# Patient Record
Sex: MALE | Race: ASIAN | Employment: FULL TIME | URBAN - METROPOLITAN AREA
[De-identification: names, ages, dates, MRNs, and addresses within clinical notes are randomized per-mention and may not be internally consistent; named-entity substitution may affect disease eponyms.]

---

## 2017-01-11 ENCOUNTER — LAB ENCOUNTER (OUTPATIENT)
Dept: LAB | Age: 52
End: 2017-01-11
Attending: FAMILY MEDICINE
Payer: COMMERCIAL

## 2017-01-11 ENCOUNTER — OFFICE VISIT (OUTPATIENT)
Dept: FAMILY MEDICINE CLINIC | Facility: CLINIC | Age: 52
End: 2017-01-11

## 2017-01-11 VITALS
WEIGHT: 188 LBS | BODY MASS INDEX: 30.22 KG/M2 | HEIGHT: 66 IN | SYSTOLIC BLOOD PRESSURE: 138 MMHG | HEART RATE: 76 BPM | TEMPERATURE: 98 F | DIASTOLIC BLOOD PRESSURE: 85 MMHG

## 2017-01-11 DIAGNOSIS — Z00.00 PHYSICAL EXAM: ICD-10-CM

## 2017-01-11 DIAGNOSIS — Z00.00 PHYSICAL EXAM: Primary | ICD-10-CM

## 2017-01-11 DIAGNOSIS — Z12.11 COLON CANCER SCREENING: ICD-10-CM

## 2017-01-11 LAB
ALBUMIN SERPL BCP-MCNC: 5 G/DL (ref 3.5–4.8)
ALBUMIN/GLOB SERPL: 1.7 {RATIO} (ref 1–2)
ALP SERPL-CCNC: 62 U/L (ref 32–100)
ALT SERPL-CCNC: 45 U/L (ref 17–63)
ANION GAP SERPL CALC-SCNC: 12 MMOL/L (ref 0–18)
APPEARANCE: CLEAR
AST SERPL-CCNC: 32 U/L (ref 15–41)
BASOPHILS # BLD: 0.1 K/UL (ref 0–0.2)
BASOPHILS NFR BLD: 1 %
BILIRUB SERPL-MCNC: 1.2 MG/DL (ref 0.3–1.2)
BILIRUBIN: NEGATIVE
BUN SERPL-MCNC: 12 MG/DL (ref 8–20)
BUN/CREAT SERPL: 10.3 (ref 10–20)
CALCIUM SERPL-MCNC: 9.9 MG/DL (ref 8.5–10.5)
CHLORIDE SERPL-SCNC: 99 MMOL/L (ref 95–110)
CHOLEST SERPL-MCNC: 124 MG/DL (ref 110–200)
CO2 SERPL-SCNC: 25 MMOL/L (ref 22–32)
CREAT SERPL-MCNC: 1.16 MG/DL (ref 0.5–1.5)
EOSINOPHIL # BLD: 0.3 K/UL (ref 0–0.7)
EOSINOPHIL NFR BLD: 3 %
ERYTHROCYTE [DISTWIDTH] IN BLOOD BY AUTOMATED COUNT: 13.9 % (ref 11–15)
GLOBULIN PLAS-MCNC: 2.9 G/DL (ref 2.5–3.7)
GLUCOSE (URINE DIPSTICK): NEGATIVE MG/DL
GLUCOSE SERPL-MCNC: 104 MG/DL (ref 70–99)
HBA1C MFR BLD: 5.9 % (ref 4–6)
HCT VFR BLD AUTO: 49.9 % (ref 41–52)
HDLC SERPL-MCNC: 37 MG/DL
HGB BLD-MCNC: 16.8 G/DL (ref 13.5–17.5)
KETONES (URINE DIPSTICK): NEGATIVE MG/DL
LDLC SERPL CALC-MCNC: 62 MG/DL (ref 0–99)
LEUKOCYTES: NEGATIVE
LYMPHOCYTES # BLD: 3.2 K/UL (ref 1–4)
LYMPHOCYTES NFR BLD: 35 %
MCH RBC QN AUTO: 29.1 PG (ref 27–32)
MCHC RBC AUTO-ENTMCNC: 33.7 G/DL (ref 32–37)
MCV RBC AUTO: 86.4 FL (ref 80–100)
MONOCYTES # BLD: 0.7 K/UL (ref 0–1)
MONOCYTES NFR BLD: 8 %
MULTISTIX LOT#: NORMAL NUMERIC
NEUTROPHILS # BLD AUTO: 5 K/UL (ref 1.8–7.7)
NEUTROPHILS NFR BLD: 54 %
NITRITE, URINE: NEGATIVE
NONHDLC SERPL-MCNC: 87 MG/DL
OCCULT BLOOD: NEGATIVE
OSMOLALITY UR CALC.SUM OF ELEC: 282 MOSM/KG (ref 275–295)
PH, URINE: 5 (ref 4.5–8)
PLATELET # BLD AUTO: 319 K/UL (ref 140–400)
PMV BLD AUTO: 7.9 FL (ref 7.4–10.3)
POTASSIUM SERPL-SCNC: 4.8 MMOL/L (ref 3.3–5.1)
PROT SERPL-MCNC: 7.9 G/DL (ref 5.9–8.4)
PROTEIN (URINE DIPSTICK): NEGATIVE MG/DL
RBC # BLD AUTO: 5.78 M/UL (ref 4.5–5.9)
SODIUM SERPL-SCNC: 136 MMOL/L (ref 136–144)
SPECIFIC GRAVITY: 1.01 (ref 1–1.03)
TRIGL SERPL-MCNC: 125 MG/DL (ref 1–149)
TSH SERPL-ACNC: 2.03 UIU/ML (ref 0.34–5.6)
URINE-COLOR: YELLOW
UROBILINOGEN,SEMI-QN: 0.2 MG/DL (ref 0–1.9)
WBC # BLD AUTO: 9.3 K/UL (ref 4–11)

## 2017-01-11 PROCEDURE — 84443 ASSAY THYROID STIM HORMONE: CPT

## 2017-01-11 PROCEDURE — 36415 COLL VENOUS BLD VENIPUNCTURE: CPT

## 2017-01-11 PROCEDURE — 82306 VITAMIN D 25 HYDROXY: CPT | Performed by: FAMILY MEDICINE

## 2017-01-11 PROCEDURE — 93000 ELECTROCARDIOGRAM COMPLETE: CPT | Performed by: FAMILY MEDICINE

## 2017-01-11 PROCEDURE — 93005 ELECTROCARDIOGRAM TRACING: CPT | Performed by: FAMILY MEDICINE

## 2017-01-11 PROCEDURE — 80061 LIPID PANEL: CPT

## 2017-01-11 PROCEDURE — 99396 PREV VISIT EST AGE 40-64: CPT | Performed by: FAMILY MEDICINE

## 2017-01-11 PROCEDURE — 83036 HEMOGLOBIN GLYCOSYLATED A1C: CPT

## 2017-01-11 PROCEDURE — 80053 COMPREHEN METABOLIC PANEL: CPT

## 2017-01-11 PROCEDURE — 81002 URINALYSIS NONAUTO W/O SCOPE: CPT | Performed by: FAMILY MEDICINE

## 2017-01-11 PROCEDURE — 85025 COMPLETE CBC W/AUTO DIFF WBC: CPT

## 2017-01-11 NOTE — PROGRESS NOTES
Quick Note:    Please call patient, results are within normal limits.  Unchanged from previous year A1c prediabetic range continue the same recommendations last year that his low carb diet exercise and recheck in 1 year  ______

## 2017-01-11 NOTE — PROGRESS NOTES
1/11/2017  8:51 AM    Bear Araya is a 54year old male. Chief complaint(s): Patient presents with:  Routine Physical    HPI:     Bear Araya primary complaint is regarding CPE.      Bear Araya is a 54year old male is here for rout 90 tablet Rfl: 0   allopurinol (ZYLOPRIM) 300 MG Oral Tab Take 1 tablet (300 mg total) by mouth daily. Take 1 tablet(s) by mouth daily Disp: 90 tablet Rfl: 1   Atorvastatin Calcium (LIPITOR) 20 MG Oral Tab Take 1 tablet (20 mg total) by mouth nightly.  Take well-nourished.    /85 mmHg  Pulse 76  Temp(Src) 98 °F (36.7 °C) (Oral)  Ht 5' 6\" (1.676 m)  Wt 188 lb (85.276 kg)  BMI 30.36 kg/m2     HENT:   Normal cephalic, normal light red reflex; pupils equally reactive to light and accomodation bilaterally; c SPECIFIC GRAVITY 1.015 1.005 - 1.030   OCCULT BLOOD negative Negative   PH, URINE 5.0 4.5 - 8.0   PROTEIN (URINE DIPSTICK) negative Negative/Trace mg/dL   UROBILINOGEN,SEMI-QN 0.2 0.0 - 1.9 mg/dL   NITRITE, URINE negative Negative   LEUKOCYTES negative N testicular exam. Patient was educated on sexual transmitted disease. Best to abstain from sexual intercourse until he is ready to form a family. Use of condoms may prevent transmission of infections as well as pregnancy.     FOLLOW-UP: Schedule a follow-up

## 2017-01-13 LAB — 25(OH)D3 SERPL-MCNC: 19.3 NG/ML

## 2017-01-13 RX ORDER — ERGOCALCIFEROL 1.25 MG/1
50000 CAPSULE ORAL WEEKLY
Qty: 12 CAPSULE | Refills: 4 | Status: SHIPPED | OUTPATIENT
Start: 2017-01-13 | End: 2017-02-12

## 2017-01-13 NOTE — PROGRESS NOTES
Quick Note:    Please notify patient that his/her blood test showed low levels of vitamin D. A prescription was sent to his pharmacy to take one capsule or tablet, once a week. This Rx is good for one year.  We'll recheck levels in one year.  ______

## 2017-01-17 ENCOUNTER — TELEPHONE (OUTPATIENT)
Dept: FAMILY MEDICINE CLINIC | Facility: CLINIC | Age: 52
End: 2017-01-17

## 2017-01-17 NOTE — TELEPHONE ENCOUNTER
Attempted to reach pt no answer left detailed message on VM to call back office. Pt's hgb A1C shows pre-diabetes same as last year will need to follow a low carb diet and exercise will recheck in one year.  Also his Vit D level is low will need to supplemen

## 2017-01-17 NOTE — TELEPHONE ENCOUNTER
----- Message from Maribell Ty MD sent at 1/12/2017  9:03 AM CST -----  Please call patient, results are within normal limits.  CPM

## 2017-01-18 ENCOUNTER — TELEPHONE (OUTPATIENT)
Dept: FAMILY MEDICINE CLINIC | Facility: CLINIC | Age: 52
End: 2017-01-18

## 2017-01-18 NOTE — TELEPHONE ENCOUNTER
----- Message from Josef Powell MD sent at 1/13/2017  2:19 PM CST -----  Please notify patient that his/her blood test showed low levels of vitamin D. A prescription was sent to his pharmacy to take one capsule or tablet, once a week.  This Rx is good

## 2017-02-13 ENCOUNTER — TELEPHONE (OUTPATIENT)
Dept: FAMILY MEDICINE CLINIC | Facility: CLINIC | Age: 52
End: 2017-02-13

## 2017-02-13 NOTE — TELEPHONE ENCOUNTER
RN states that pt. Is scheduled to come in today 2/13 at 1:00 for Consult appt. , so sheis Requesting to get last office notes, and any labs. Please fax to [facility] #  [phone].

## 2017-03-01 RX ORDER — ALLOPURINOL 300 MG/1
TABLET ORAL
Qty: 30 TABLET | Refills: 0 | Status: CANCELLED | OUTPATIENT
Start: 2017-03-01

## 2017-03-01 RX ORDER — COLCHICINE 0.6 MG/1
0.6 TABLET ORAL DAILY
Qty: 90 TABLET | Refills: 0 | Status: SHIPPED | OUTPATIENT
Start: 2017-03-01 | End: 2018-03-01

## 2017-03-01 RX ORDER — ALLOPURINOL 300 MG/1
TABLET ORAL
Qty: 90 TABLET | Refills: 0 | Status: SHIPPED | OUTPATIENT
Start: 2017-03-01 | End: 2017-05-16

## 2017-03-01 RX ORDER — LISINOPRIL AND HYDROCHLOROTHIAZIDE 20; 12.5 MG/1; MG/1
1 TABLET ORAL DAILY
Qty: 90 TABLET | Refills: 0 | Status: SHIPPED | OUTPATIENT
Start: 2017-03-01 | End: 2017-06-12

## 2017-03-01 NOTE — TELEPHONE ENCOUNTER
From: Yong Backfelix  To:  Ronn Rahman MD  Sent: 2/28/2017 2:48 PM CST  Subject: Medication Renewal Request    Original authorizing provider: MD Yong Hoffmann would like a refill of the following medications:  Eulalia Duff

## 2017-03-01 NOTE — TELEPHONE ENCOUNTER
Chart reviewed. Refills sent per Triage Dept protocol.    Hypertensive Medications, Gout  Protocol Criteria:  · Appointment scheduled in the past 6 months or in the next 3 months  · BMP or CMP in the past 12 months  · Creatinine result < 2  Recent Visits

## 2017-03-01 NOTE — TELEPHONE ENCOUNTER
From: Tiffany Bragg  To:  Theresa Odom MD  Sent: 2/28/2017 2:43 PM CST  Subject: Medication Renewal Request    Original authorizing provider: MD Tiffany Bro would like a refill of the following medications:  colchicine

## 2017-04-07 RX ORDER — ATORVASTATIN CALCIUM 20 MG/1
TABLET, FILM COATED ORAL
Qty: 90 TABLET | Refills: 0 | Status: SHIPPED | OUTPATIENT
Start: 2017-04-07 | End: 2017-09-08

## 2017-04-07 RX ORDER — ALLOPURINOL 300 MG/1
TABLET ORAL
Qty: 90 TABLET | Refills: 0 | OUTPATIENT
Start: 2017-04-07

## 2017-04-07 RX ORDER — LISINOPRIL AND HYDROCHLOROTHIAZIDE 20; 12.5 MG/1; MG/1
1 TABLET ORAL DAILY
Qty: 90 TABLET | Refills: 0 | OUTPATIENT
Start: 2017-04-07

## 2017-04-07 NOTE — TELEPHONE ENCOUNTER
Cholesterol Medications: DR. WANG,  Please review and advise on drug interaction. Thanks.   Protocol Criteria:  · Appointment scheduled in the past 12 months or in the next 3 months  · ALT & LDL on file in the past 12 months  · ALT result < 80  · LDL result <

## 2017-04-07 NOTE — TELEPHONE ENCOUNTER
From: Eugene Ndiaye  To:  Veronika Dawn MD  Sent: 4/4/2017 1:42 PM CDT  Subject: Medication Renewal Request    Original authorizing provider: MD Eugene Huerta would like a refill of the following medications:  Tram Gutierrez

## 2017-05-16 RX ORDER — ALLOPURINOL 300 MG/1
TABLET ORAL
Qty: 90 TABLET | Refills: 0 | Status: SHIPPED | OUTPATIENT
Start: 2017-05-16 | End: 2017-10-26

## 2017-05-16 RX ORDER — LISINOPRIL AND HYDROCHLOROTHIAZIDE 20; 12.5 MG/1; MG/1
1 TABLET ORAL DAILY
Qty: 90 TABLET | Refills: 0 | Status: SHIPPED | OUTPATIENT
Start: 2017-05-16 | End: 2017-06-13

## 2017-05-16 NOTE — TELEPHONE ENCOUNTER
Per RN refill protocol, rx's sent to pharm    Hypertensive Medications  Protocol Criteria:  · Appointment scheduled in the past 6 months or in the next 3 months  · BMP or CMP in the past 12 months  · Creatinine result < 2  Recent Visits       Provider Prince

## 2017-05-16 NOTE — TELEPHONE ENCOUNTER
From: Darice Dakin  To:  Teresita Crespo MD  Sent: 5/5/2017 9:52 AM CDT  Subject: Medication Renewal Request    Original authorizing provider: Margaret Grays, MD Darice Dakin would like a refill of the following medications:  Lisinopril-

## 2017-06-12 ENCOUNTER — PATIENT MESSAGE (OUTPATIENT)
Dept: FAMILY MEDICINE CLINIC | Facility: CLINIC | Age: 52
End: 2017-06-12

## 2017-06-12 RX ORDER — LISINOPRIL AND HYDROCHLOROTHIAZIDE 20; 12.5 MG/1; MG/1
TABLET ORAL
Qty: 90 TABLET | Refills: 1 | Status: SHIPPED | OUTPATIENT
Start: 2017-06-12 | End: 2018-04-17

## 2017-06-13 RX ORDER — LISINOPRIL AND HYDROCHLOROTHIAZIDE 20; 12.5 MG/1; MG/1
1 TABLET ORAL DAILY
Qty: 90 TABLET | Refills: 0 | Status: SHIPPED | OUTPATIENT
Start: 2017-06-13 | End: 2017-09-23

## 2017-06-13 RX ORDER — LISINOPRIL AND HYDROCHLOROTHIAZIDE 20; 12.5 MG/1; MG/1
1 TABLET ORAL DAILY
Qty: 30 TABLET | Refills: 0 | Status: SHIPPED | OUTPATIENT
Start: 2017-06-13 | End: 2018-04-17

## 2017-06-13 NOTE — TELEPHONE ENCOUNTER
From: Taylor Muller  To:  tOto Mcduffie MD  Sent: 6/12/2017 10:52 AM CDT  Subject: Medication Renewal Request    Original authorizing provider: MD Taylor Escobar would like a refill of the following medications:  Sergio Schrader

## 2017-06-13 NOTE — TELEPHONE ENCOUNTER
From: Randolph Quiroz  To:  Yesi Eric MD  Sent: 6/12/2017 10:49 AM CDT  Subject: Medication Renewal Request    Original authorizing provider: MD Randolph Johnson would like a refill of the following medications:  Angie Landry

## 2017-06-13 NOTE — TELEPHONE ENCOUNTER
30 day sent to local in previous encounter. 90 day sent to mail order per protocol.     Hypertensive Medications  Protocol Criteria:  · Appointment scheduled in the past 6 months or in the next 3 months  · BMP or CMP in the past 12 months  · Creatinine re

## 2017-06-13 NOTE — TELEPHONE ENCOUNTER
Refilled x1 per protocol.    Hypertensive Medications  Protocol Criteria:  · Appointment scheduled in the past 6 months or in the next 3 months  · BMP or CMP in the past 12 months  · Creatinine result < 2  Recent Visits       Provider Department Primary Dx

## 2017-08-19 ENCOUNTER — TELEPHONE (OUTPATIENT)
Dept: FAMILY MEDICINE CLINIC | Facility: CLINIC | Age: 52
End: 2017-08-19

## 2017-09-08 RX ORDER — ATORVASTATIN CALCIUM 20 MG/1
TABLET, FILM COATED ORAL
Qty: 90 TABLET | Refills: 0 | Status: SHIPPED | OUTPATIENT
Start: 2017-09-08 | End: 2017-10-16

## 2017-09-24 RX ORDER — LISINOPRIL AND HYDROCHLOROTHIAZIDE 20; 12.5 MG/1; MG/1
TABLET ORAL
Qty: 90 TABLET | Refills: 0 | Status: SHIPPED | OUTPATIENT
Start: 2017-09-24 | End: 2017-12-29

## 2017-10-17 RX ORDER — ATORVASTATIN CALCIUM 20 MG/1
TABLET, FILM COATED ORAL
Qty: 90 TABLET | Refills: 0 | Status: SHIPPED | OUTPATIENT
Start: 2017-10-17 | End: 2018-03-10

## 2017-10-26 ENCOUNTER — TELEPHONE (OUTPATIENT)
Dept: FAMILY MEDICINE CLINIC | Facility: CLINIC | Age: 52
End: 2017-10-26

## 2017-10-26 RX ORDER — ALLOPURINOL 300 MG/1
TABLET ORAL
Qty: 90 TABLET | Refills: 0 | Status: SHIPPED | OUTPATIENT
Start: 2017-10-26 | End: 2018-01-25

## 2017-10-26 NOTE — TELEPHONE ENCOUNTER
Pt's pharmacy called in requesting a 90 day supply for the following medication:      Current Outpatient Prescriptions:   •  allopurinol 300 MG Oral Tab, Take 1 tablet by mouth  daily, Disp: 90 tablet, Rfl: 0

## 2017-10-26 NOTE — TELEPHONE ENCOUNTER
Refill Protocol Appointment Criteria  · Appointment scheduled in the past 6 months or in the next 3 months  Recent Outpatient Visits            9 months ago Physical exam    Sara Narvaez MD    Office Visit    1 y

## 2017-12-29 RX ORDER — LISINOPRIL AND HYDROCHLOROTHIAZIDE 20; 12.5 MG/1; MG/1
TABLET ORAL
Qty: 30 TABLET | Refills: 0 | Status: SHIPPED | OUTPATIENT
Start: 2017-12-29 | End: 2018-01-25

## 2018-01-25 RX ORDER — LISINOPRIL AND HYDROCHLOROTHIAZIDE 20; 12.5 MG/1; MG/1
TABLET ORAL
Qty: 30 TABLET | Refills: 0 | Status: SHIPPED | OUTPATIENT
Start: 2018-01-25 | End: 2018-04-17

## 2018-01-25 RX ORDER — ALLOPURINOL 300 MG/1
TABLET ORAL
Qty: 30 TABLET | Refills: 0 | Status: SHIPPED | OUTPATIENT
Start: 2018-01-25 | End: 2018-03-17

## 2018-02-05 RX ORDER — LISINOPRIL AND HYDROCHLOROTHIAZIDE 20; 12.5 MG/1; MG/1
1 TABLET ORAL
Qty: 30 TABLET | Refills: 0
Start: 2018-02-05

## 2018-02-05 RX ORDER — ALLOPURINOL 300 MG/1
300 TABLET ORAL
Qty: 30 TABLET | Refills: 0
Start: 2018-02-05

## 2018-02-05 RX ORDER — ATORVASTATIN CALCIUM 20 MG/1
20 TABLET, FILM COATED ORAL NIGHTLY
Qty: 90 TABLET | Refills: 0
Start: 2018-02-05

## 2018-02-05 NOTE — TELEPHONE ENCOUNTER
Please call patient to set up an appointment with me. In order to refills meds needs a f/u appt with me.

## 2018-03-01 ENCOUNTER — NURSE TRIAGE (OUTPATIENT)
Dept: FAMILY MEDICINE CLINIC | Facility: CLINIC | Age: 53
End: 2018-03-01

## 2018-03-01 NOTE — TELEPHONE ENCOUNTER
Pt called in, he is not able to see  today. He is still requesting a refill of the medication for gout flare up. Pt is willing to schedule an appt but no 9am appts soon. He is requesting that Dr. Radha Brush call him directly. Phone number is [phone].

## 2018-03-01 NOTE — TELEPHONE ENCOUNTER
After reviewing chart, I left message for patient to call and come in today. CSS book him in available slot 10:15am with Dr. Sukhjinder Rahman if still available.      Will send to DR WANG for review

## 2018-03-01 NOTE — TELEPHONE ENCOUNTER
Dr Sammy Uriarte: patient called for refill on his colcrys medication. He started with RT big toe gout flare up yesterday. Now painful to walk. He was hoping to start medication today. LOV 1/2017.  I informed him we haven't seen him in a year and I am unabl

## 2018-03-02 RX ORDER — COLCHICINE 0.6 MG/1
0.6 TABLET ORAL DAILY
Qty: 30 TABLET | Refills: 0 | Status: SHIPPED
Start: 2018-03-02 | End: 2018-04-17

## 2018-03-02 NOTE — TELEPHONE ENCOUNTER
Dr Clari Bolden for Dr Susann Carrel,    See messages below. Can pt have refill on colcry?     Refill Protocol Appointment Criteria  · Appointment scheduled in the past 6 months or in the next 3 months  Recent Outpatient Visits            1 year ago Physical exam    [place]

## 2018-03-03 RX ORDER — COLCHICINE 0.6 MG/1
TABLET ORAL
Qty: 90 TABLET | Refills: 0 | OUTPATIENT
Start: 2018-03-03

## 2018-03-12 RX ORDER — LISINOPRIL AND HYDROCHLOROTHIAZIDE 20; 12.5 MG/1; MG/1
TABLET ORAL
Qty: 10 TABLET | Refills: 0 | Status: SHIPPED | OUTPATIENT
Start: 2018-03-12 | End: 2018-03-17

## 2018-03-12 RX ORDER — ATORVASTATIN CALCIUM 20 MG/1
TABLET, FILM COATED ORAL
Qty: 10 TABLET | Refills: 0 | Status: SHIPPED | OUTPATIENT
Start: 2018-03-12 | End: 2018-04-17

## 2018-03-12 NOTE — TELEPHONE ENCOUNTER
appt made, ok to refill?      Future Appointments  Date Time Provider Jacqueline Callahan   4/6/2018 8:15 AM Sachin Melendez MD [facility]

## 2018-03-13 NOTE — TELEPHONE ENCOUNTER
From: Devin Hernández  Sent: 3/10/2018 2:24 PM CST  Subject: Medication Renewal Request    Devin Hernández would like a refill of the following medications:     ATORVASTATIN 20 MG Oral Tab Danisha Rey MD]     LISINOPRIL-HYDROCHLOROTHIAZIDE 20-

## 2018-03-15 RX ORDER — ALLOPURINOL 300 MG/1
300 TABLET ORAL
Qty: 30 TABLET | Refills: 0
Start: 2018-03-15

## 2018-03-15 RX ORDER — ATORVASTATIN CALCIUM 20 MG/1
TABLET, FILM COATED ORAL
Qty: 10 TABLET | Refills: 0 | Status: CANCELLED
Start: 2018-03-15

## 2018-03-15 RX ORDER — LISINOPRIL AND HYDROCHLOROTHIAZIDE 20; 12.5 MG/1; MG/1
1 TABLET ORAL
Qty: 10 TABLET | Refills: 0
Start: 2018-03-15

## 2018-03-17 RX ORDER — LISINOPRIL AND HYDROCHLOROTHIAZIDE 20; 12.5 MG/1; MG/1
1 TABLET ORAL
Qty: 30 TABLET | Refills: 2 | Status: CANCELLED
Start: 2018-03-17

## 2018-03-17 RX ORDER — ATORVASTATIN CALCIUM 20 MG/1
20 TABLET, FILM COATED ORAL NIGHTLY
Qty: 90 TABLET | Refills: 0 | Status: CANCELLED | OUTPATIENT
Start: 2018-03-17

## 2018-03-17 RX ORDER — ALLOPURINOL 300 MG/1
300 TABLET ORAL
Qty: 30 TABLET | Refills: 2 | Status: CANCELLED
Start: 2018-03-17

## 2018-03-17 NOTE — TELEPHONE ENCOUNTER
Refill protocol failed because the patient did not meet the protocol criteria.  Please advise in regards to refill request       Cholesterol Medications  Protocol Criteria:  · Appointment scheduled in the past 12 months or in the next 3 months  · ALT & LDL 01/11/2017   GLOBULIN 2.9 01/11/2017   AGRATIO 1.4 02/01/2016   ANIONGAP 12 01/11/2017   OSMOCALC 282 01/11/2017

## 2018-03-19 ENCOUNTER — TELEPHONE (OUTPATIENT)
Dept: FAMILY MEDICINE CLINIC | Facility: CLINIC | Age: 53
End: 2018-03-19

## 2018-03-19 RX ORDER — LISINOPRIL AND HYDROCHLOROTHIAZIDE 20; 12.5 MG/1; MG/1
1 TABLET ORAL
Qty: 30 TABLET | Refills: 0 | Status: SHIPPED | OUTPATIENT
Start: 2018-03-19 | End: 2018-04-15

## 2018-03-19 RX ORDER — ATORVASTATIN CALCIUM 20 MG/1
20 TABLET, FILM COATED ORAL NIGHTLY
Qty: 30 TABLET | Refills: 0 | Status: SHIPPED | OUTPATIENT
Start: 2018-03-19 | End: 2018-04-17

## 2018-03-19 RX ORDER — ALLOPURINOL 300 MG/1
300 TABLET ORAL
Qty: 30 TABLET | Refills: 0 | Status: SHIPPED | OUTPATIENT
Start: 2018-03-19 | End: 2018-04-16

## 2018-03-19 NOTE — TELEPHONE ENCOUNTER
Dr. Heena Schwartz: patient made appt 4/6/18. Are you ok to approved a month supply. On 3/10/18, Dr. Dr. Heena Schwartz approved #10. However it went to the mail order and they do not send short supply. Can rx be sent to [place] for 30qty?

## 2018-03-19 NOTE — TELEPHONE ENCOUNTER
SEE other encounters. Gratci. Spoke to representative, [place], and will cancel RX for lisinopril/hctz, and atorvastatin. See 3/17 encounter. Request has been sent to Dr. Avis Johnston to authorize 30 day supply to local [facility] if he approves.   Pa

## 2018-03-19 NOTE — TELEPHONE ENCOUNTER
Pt calling on status of rxs  Out of meds    Confirmed Dav[facility]  does not want sent to mailorder    Requesting to pharmacy today

## 2018-03-20 RX ORDER — ALLOPURINOL 300 MG/1
TABLET ORAL
Qty: 90 TABLET | Refills: 0 | Status: SHIPPED | OUTPATIENT
Start: 2018-03-20 | End: 2018-04-17

## 2018-04-06 NOTE — TELEPHONE ENCOUNTER
Pt had an appt to see Dr Pramod Ayala for a physical but arrived to office 40 min late so he had to be rescheduled. Pt stated that he was low on all medications and wanted Dr to refill all his prescriptions .  Pt appt was made for June 4th the next available for physical

## 2018-04-10 RX ORDER — ATORVASTATIN CALCIUM 20 MG/1
TABLET, FILM COATED ORAL
Qty: 10 TABLET | Refills: 0 | Status: CANCELLED | OUTPATIENT
Start: 2018-04-10

## 2018-04-14 RX ORDER — ALLOPURINOL 300 MG/1
TABLET ORAL
Qty: 30 TABLET | Refills: 0 | OUTPATIENT
Start: 2018-04-14

## 2018-04-15 RX ORDER — ALLOPURINOL 300 MG/1
300 TABLET ORAL
Qty: 90 TABLET | Refills: 0 | Status: CANCELLED
Start: 2018-04-15

## 2018-04-17 ENCOUNTER — LAB ENCOUNTER (OUTPATIENT)
Dept: LAB | Age: 53
End: 2018-04-17
Attending: FAMILY MEDICINE
Payer: COMMERCIAL

## 2018-04-17 ENCOUNTER — OFFICE VISIT (OUTPATIENT)
Dept: FAMILY MEDICINE CLINIC | Facility: CLINIC | Age: 53
End: 2018-04-17

## 2018-04-17 VITALS
HEIGHT: 66 IN | HEART RATE: 69 BPM | BODY MASS INDEX: 31.02 KG/M2 | TEMPERATURE: 98 F | SYSTOLIC BLOOD PRESSURE: 124 MMHG | WEIGHT: 193 LBS | DIASTOLIC BLOOD PRESSURE: 79 MMHG

## 2018-04-17 DIAGNOSIS — Z00.00 PHYSICAL EXAM: Primary | ICD-10-CM

## 2018-04-17 DIAGNOSIS — Z00.00 PHYSICAL EXAM: ICD-10-CM

## 2018-04-17 PROCEDURE — 84153 ASSAY OF PSA TOTAL: CPT | Performed by: FAMILY MEDICINE

## 2018-04-17 PROCEDURE — 83036 HEMOGLOBIN GLYCOSYLATED A1C: CPT

## 2018-04-17 PROCEDURE — 80061 LIPID PANEL: CPT

## 2018-04-17 PROCEDURE — 81015 MICROSCOPIC EXAM OF URINE: CPT | Performed by: FAMILY MEDICINE

## 2018-04-17 PROCEDURE — 85025 COMPLETE CBC W/AUTO DIFF WBC: CPT

## 2018-04-17 PROCEDURE — 80050 GENERAL HEALTH PANEL: CPT

## 2018-04-17 PROCEDURE — 80053 COMPREHEN METABOLIC PANEL: CPT

## 2018-04-17 PROCEDURE — 36415 COLL VENOUS BLD VENIPUNCTURE: CPT

## 2018-04-17 PROCEDURE — 99396 PREV VISIT EST AGE 40-64: CPT | Performed by: FAMILY MEDICINE

## 2018-04-17 PROCEDURE — 84550 ASSAY OF BLOOD/URIC ACID: CPT

## 2018-04-17 PROCEDURE — 81001 URINALYSIS AUTO W/SCOPE: CPT | Performed by: FAMILY MEDICINE

## 2018-04-17 PROCEDURE — 82306 VITAMIN D 25 HYDROXY: CPT | Performed by: FAMILY MEDICINE

## 2018-04-17 RX ORDER — LISINOPRIL AND HYDROCHLOROTHIAZIDE 20; 12.5 MG/1; MG/1
1 TABLET ORAL
Qty: 10 TABLET | Refills: 0 | Status: SHIPPED | OUTPATIENT
Start: 2018-04-17 | End: 2018-04-17

## 2018-04-17 RX ORDER — ALLOPURINOL 300 MG/1
TABLET ORAL
Qty: 90 TABLET | Refills: 2 | Status: SHIPPED | OUTPATIENT
Start: 2018-04-17 | End: 2019-04-08

## 2018-04-17 RX ORDER — ATORVASTATIN CALCIUM 20 MG/1
TABLET, FILM COATED ORAL
Qty: 90 TABLET | Refills: 2 | Status: SHIPPED | OUTPATIENT
Start: 2018-04-17 | End: 2019-04-24

## 2018-04-17 RX ORDER — LISINOPRIL AND HYDROCHLOROTHIAZIDE 20; 12.5 MG/1; MG/1
1 TABLET ORAL
Qty: 90 TABLET | Refills: 2 | Status: SHIPPED | OUTPATIENT
Start: 2018-04-17 | End: 2019-04-04

## 2018-04-17 RX ORDER — ALLOPURINOL 300 MG/1
TABLET ORAL
Qty: 10 TABLET | Refills: 0 | Status: SHIPPED | OUTPATIENT
Start: 2018-04-17 | End: 2018-04-17

## 2018-04-17 RX ORDER — COLCHICINE 0.6 MG/1
0.6 TABLET ORAL DAILY
Qty: 30 TABLET | Refills: 0 | Status: SHIPPED | OUTPATIENT
Start: 2018-04-17 | End: 2019-04-24

## 2018-04-17 NOTE — PROGRESS NOTES
4/17/2018  3:49 PM    Debi York is a 48year old male.     Chief complaint(s): Patient presents with:  Routine Physical: patient here for his annual physical and needs 90 day supply of medications    HPI:     Debi Yrok primary complaint is mouth daily. Disp: 30 tablet Rfl: 0   Lisinopril-Hydrochlorothiazide 20-12.5 MG Oral Tab Take 1 tablet by mouth once daily.  Disp: 90 tablet Rfl: 2   ValACYclovir HCl (VALTREX) 500 MG Oral Tab Take one tablet once a day Disp:  Rfl:        Allergies:  No Seno bilaterally. Normal finger rubbing hearing exam, bilaterally. Clear nostril normal nasal mucosa. Throat clear without exudate, lesions or enlarge tonsils. Neck: Neck supple. No JVD present. No thyromegaly present.    Cardiovascular: Normal rate, regular Urinalysis, Routine [E]      Urine Microscopic w Reflex CULTURE      Vitamin D, 25-Hydroxy [E]      Uric Acid, Serum   In-House; Urine dip. Test/Procedures done today include: EKG. IMMUNIZATIONS: none given today.     RECOMMENDATIONS given include: ANTI MG Oral Tab 30 tablet 0      Sig: Take 1 tablet (0.6 mg total) by mouth daily. Lisinopril-Hydrochlorothiazide 20-12.5 MG Oral Tab 90 tablet 2      Sig: Take 1 tablet by mouth once daily.            Imaging & Referrals:  EKG 12-LEAD         [facility]IS[facility]

## 2018-04-18 ENCOUNTER — TELEPHONE (OUTPATIENT)
Dept: FAMILY MEDICINE CLINIC | Facility: CLINIC | Age: 53
End: 2018-04-18

## 2018-04-18 RX ORDER — ERGOCALCIFEROL 1.25 MG/1
50000 CAPSULE ORAL WEEKLY
Qty: 12 CAPSULE | Refills: 4 | Status: SHIPPED | OUTPATIENT
Start: 2018-04-18 | End: 2018-05-18

## 2018-04-18 NOTE — TELEPHONE ENCOUNTER
----- Message from Nicolette Manzo MD sent at 4/18/2018  3:06 PM CDT -----  Please notify patient that his/her blood test showed low levels of vitamin D. A prescription was sent to his pharmacy to take one capsule or tablet, once a week.  This Rx is good

## 2018-05-09 RX ORDER — ALLOPURINOL 300 MG/1
300 TABLET ORAL
Qty: 90 TABLET | Refills: 0 | OUTPATIENT
Start: 2018-05-09

## 2018-05-09 RX ORDER — ATORVASTATIN CALCIUM 20 MG/1
20 TABLET, FILM COATED ORAL
Qty: 90 TABLET | Refills: 0 | OUTPATIENT
Start: 2018-05-09

## 2018-05-09 RX ORDER — LISINOPRIL AND HYDROCHLOROTHIAZIDE 20; 12.5 MG/1; MG/1
1 TABLET ORAL
Qty: 30 TABLET | Refills: 0 | OUTPATIENT
Start: 2018-05-09

## 2018-05-17 RX ORDER — LISINOPRIL AND HYDROCHLOROTHIAZIDE 20; 12.5 MG/1; MG/1
1 TABLET ORAL DAILY
Qty: 90 TABLET | Refills: 2 | Status: SHIPPED | OUTPATIENT
Start: 2018-05-17 | End: 2019-03-04

## 2018-06-15 RX ORDER — ALLOPURINOL 300 MG/1
TABLET ORAL
Qty: 30 TABLET | Refills: 0 | OUTPATIENT
Start: 2018-06-15

## 2018-07-20 RX ORDER — ATORVASTATIN CALCIUM 20 MG/1
TABLET, FILM COATED ORAL
Qty: 30 TABLET | Refills: 0 | OUTPATIENT
Start: 2018-07-20

## 2018-12-24 NOTE — TELEPHONE ENCOUNTER
wrong sent to Dr Samuels, will send to Dr Lizabeth Lombardi. Review pended refill request as it does not fall under a protocol.     Last Rx: 4/17/18  LOV: 4/17/18

## 2018-12-26 RX ORDER — COLCHICINE 0.6 MG/1
TABLET, FILM COATED ORAL
Qty: 90 TABLET | Refills: 0 | Status: SHIPPED | OUTPATIENT
Start: 2018-12-26 | End: 2019-04-24

## 2019-03-05 RX ORDER — LISINOPRIL AND HYDROCHLOROTHIAZIDE 20; 12.5 MG/1; MG/1
1 TABLET ORAL DAILY
Qty: 30 TABLET | Refills: 0 | Status: SHIPPED | OUTPATIENT
Start: 2019-03-05 | End: 2019-04-03

## 2019-04-04 RX ORDER — ALLOPURINOL 300 MG/1
TABLET ORAL
Qty: 90 TABLET | Refills: 0 | OUTPATIENT
Start: 2019-04-04

## 2019-04-04 RX ORDER — LISINOPRIL AND HYDROCHLOROTHIAZIDE 20; 12.5 MG/1; MG/1
1 TABLET ORAL DAILY
Qty: 90 TABLET | Refills: 0 | Status: SHIPPED | OUTPATIENT
Start: 2019-04-04 | End: 2019-04-24

## 2019-04-04 NOTE — TELEPHONE ENCOUNTER
Review pended refill request as it does not fall under a protocol. Last RX: 4/17/18 allopurinol 300mg 30 tablet with zero refill.   LOV: 12/26/18

## 2019-04-08 NOTE — TELEPHONE ENCOUNTER
Dr Pramod Ayala please advise on refill for Allopurinol, pt has an appt to see you on 4/24/19. Please see msg below. Per pt, he has an appt already but he needs a refill on his Allopurinol asap.          Documentation

## 2019-04-09 RX ORDER — ALLOPURINOL 300 MG/1
TABLET ORAL
Qty: 30 TABLET | Refills: 0 | Status: SHIPPED | OUTPATIENT
Start: 2019-04-09 | End: 2019-04-24

## 2019-04-24 ENCOUNTER — LAB ENCOUNTER (OUTPATIENT)
Dept: LAB | Age: 54
End: 2019-04-24
Attending: FAMILY MEDICINE
Payer: COMMERCIAL

## 2019-04-24 ENCOUNTER — OFFICE VISIT (OUTPATIENT)
Dept: FAMILY MEDICINE CLINIC | Facility: CLINIC | Age: 54
End: 2019-04-24
Payer: COMMERCIAL

## 2019-04-24 VITALS
BODY MASS INDEX: 30.34 KG/M2 | HEART RATE: 65 BPM | DIASTOLIC BLOOD PRESSURE: 84 MMHG | SYSTOLIC BLOOD PRESSURE: 128 MMHG | TEMPERATURE: 98 F | WEIGHT: 188.81 LBS | HEIGHT: 66 IN

## 2019-04-24 DIAGNOSIS — Z00.00 PHYSICAL EXAM: ICD-10-CM

## 2019-04-24 DIAGNOSIS — Z00.00 PHYSICAL EXAM: Primary | ICD-10-CM

## 2019-04-24 PROCEDURE — 84153 ASSAY OF PSA TOTAL: CPT | Performed by: FAMILY MEDICINE

## 2019-04-24 PROCEDURE — 80053 COMPREHEN METABOLIC PANEL: CPT

## 2019-04-24 PROCEDURE — 81015 MICROSCOPIC EXAM OF URINE: CPT | Performed by: FAMILY MEDICINE

## 2019-04-24 PROCEDURE — 84443 ASSAY THYROID STIM HORMONE: CPT

## 2019-04-24 PROCEDURE — 36415 COLL VENOUS BLD VENIPUNCTURE: CPT

## 2019-04-24 PROCEDURE — 90715 TDAP VACCINE 7 YRS/> IM: CPT | Performed by: FAMILY MEDICINE

## 2019-04-24 PROCEDURE — 85025 COMPLETE CBC W/AUTO DIFF WBC: CPT

## 2019-04-24 PROCEDURE — 82306 VITAMIN D 25 HYDROXY: CPT | Performed by: FAMILY MEDICINE

## 2019-04-24 PROCEDURE — 80061 LIPID PANEL: CPT

## 2019-04-24 PROCEDURE — 99396 PREV VISIT EST AGE 40-64: CPT | Performed by: FAMILY MEDICINE

## 2019-04-24 PROCEDURE — 83036 HEMOGLOBIN GLYCOSYLATED A1C: CPT

## 2019-04-24 PROCEDURE — 90471 IMMUNIZATION ADMIN: CPT | Performed by: FAMILY MEDICINE

## 2019-04-24 PROCEDURE — 81001 URINALYSIS AUTO W/SCOPE: CPT | Performed by: FAMILY MEDICINE

## 2019-04-24 RX ORDER — ATORVASTATIN CALCIUM 20 MG/1
TABLET, FILM COATED ORAL
Qty: 90 TABLET | Refills: 2 | Status: SHIPPED | OUTPATIENT
Start: 2019-04-24 | End: 2020-02-23

## 2019-04-24 RX ORDER — ALLOPURINOL 300 MG/1
TABLET ORAL
Qty: 30 TABLET | Refills: 0 | Status: SHIPPED | OUTPATIENT
Start: 2019-04-24 | End: 2019-05-09

## 2019-04-24 RX ORDER — COLCHICINE 0.6 MG/1
0.6 TABLET ORAL DAILY
Qty: 30 TABLET | Refills: 0 | Status: SHIPPED | OUTPATIENT
Start: 2019-04-24 | End: 2020-08-20

## 2019-04-24 RX ORDER — LISINOPRIL AND HYDROCHLOROTHIAZIDE 20; 12.5 MG/1; MG/1
1 TABLET ORAL DAILY
Qty: 90 TABLET | Refills: 0 | Status: SHIPPED | OUTPATIENT
Start: 2019-04-24 | End: 2019-05-09

## 2019-04-24 NOTE — PROGRESS NOTES
4/24/2019  8:10 AM    Mary Beth Serrano is a 48year old male. Chief complaint(s): Patient presents with:  Routine Physical    HPI:     Mary Beth Serrano primary complaint is regarding CPE.      Mary Beth Serrano is a 48year old male is here for rout Oral Tab Take 1 tablet (0.6 mg total) by mouth daily.  Disp: 30 tablet Rfl: 0   atorvastatin 20 MG Oral Tab TAKE 1 TABLET EVERY EVENING Disp: 90 tablet Rfl: 2   allopurinol 300 MG Oral Tab 1 tab po Q day Disp: 30 tablet Rfl: 0   Hydrocortisone Ace-Pramoxine red reflex; pupils equally reactive to light and accomodation bilaterally; clear sclera without icteric. Normal tympanic membranes, normal light reflex bilaterally. Normal finger rubbing hearing exam, bilaterally. Clear nostril normal nasal mucosa.   Thro Reflex to Free [E]      TSH W Reflex To Free T4 [E]      Urinalysis, Routine [E]      Urine Microscopic w Reflex CULTURE      Vitamin D, 25-Hydroxy [E]      TETANUS, DIPHTHERIA TOXOIDS AND ACELLULAR PERTUSIS VACCINE (TDAP), >7 YEARS, IM USE      Zoster Rec Zoster Recombinant Adjuvanted [Shingrix -Shingles] (25270)      Meds This Visit:  Requested Prescriptions     Signed Prescriptions Disp Refills   • Lisinopril-hydroCHLOROthiazide 20-12.5 MG Oral Tab 90 tablet 0     Sig: Take 1 tablet by mouth daily.    • co

## 2019-04-27 RX ORDER — ERGOCALCIFEROL 1.25 MG/1
50000 CAPSULE ORAL WEEKLY
Qty: 12 CAPSULE | Refills: 4 | Status: SHIPPED | OUTPATIENT
Start: 2019-04-27 | End: 2019-05-09

## 2019-05-02 RX ORDER — ATORVASTATIN CALCIUM 20 MG/1
TABLET, FILM COATED ORAL
Qty: 90 TABLET | Refills: 2 | OUTPATIENT
Start: 2019-05-02

## 2019-05-02 NOTE — TELEPHONE ENCOUNTER
RN=pleaser call patient and clarify pharmacy, was refilled on 4/24/19 90 tabs +2 refills and was sent to [facility].

## 2019-05-09 NOTE — TELEPHONE ENCOUNTER
Refill passed per [facility], [facility] protocol.     Hypertensive Medications  Protocol Criteria:  · Appointment scheduled in the past 6 months or in the next 3 months  · BMP or CMP in the past 12 months  · Creatinine result < 2  Recent Outpatient Visits

## 2019-05-10 RX ORDER — ERGOCALCIFEROL 1.25 MG/1
50000 CAPSULE ORAL WEEKLY
Qty: 12 CAPSULE | Refills: 3 | Status: SHIPPED | OUTPATIENT
Start: 2019-05-10 | End: 2020-10-13

## 2019-05-10 RX ORDER — LISINOPRIL AND HYDROCHLOROTHIAZIDE 20; 12.5 MG/1; MG/1
1 TABLET ORAL DAILY
Qty: 90 TABLET | Refills: 1 | Status: SHIPPED | OUTPATIENT
Start: 2019-05-10 | End: 2019-11-02

## 2019-05-10 RX ORDER — ALLOPURINOL 300 MG/1
TABLET ORAL
Qty: 90 TABLET | Refills: 1 | Status: SHIPPED | OUTPATIENT
Start: 2019-05-10 | End: 2019-11-02

## 2019-05-10 NOTE — TELEPHONE ENCOUNTER
Refill passed per [facility], [facility] protocol.     Hypertensive Medications  Protocol Criteria:  · Appointment scheduled in the past 6 months or in the next 3 months  · BMP or CMP in the past 12 months  · Creatinine result < 2  Recent Outpatient Visits

## 2019-05-10 NOTE — TELEPHONE ENCOUNTER
Per CSS, patient calling and wants to send all his prescriptions to [facility]. Called [facility] and spoke with Eddi Chen, cancelled ergocalciferol per patient request, wants to send it to [facility] (pharmacy updated and on file).     Notes recorded by Urile Freitas

## 2019-06-07 ENCOUNTER — PATIENT MESSAGE (OUTPATIENT)
Dept: OTHER | Age: 54
End: 2019-06-07

## 2019-06-07 RX ORDER — ALLOPURINOL 300 MG/1
TABLET ORAL
Qty: 90 TABLET | Refills: 1 | OUTPATIENT
Start: 2019-06-07

## 2019-06-07 RX ORDER — LISINOPRIL AND HYDROCHLOROTHIAZIDE 20; 12.5 MG/1; MG/1
1 TABLET ORAL DAILY
Qty: 90 TABLET | Refills: 1 | OUTPATIENT
Start: 2019-06-07

## 2019-06-07 RX ORDER — ATORVASTATIN CALCIUM 20 MG/1
TABLET, FILM COATED ORAL
Qty: 90 TABLET | Refills: 2 | Status: CANCELLED | OUTPATIENT
Start: 2019-06-07

## 2019-06-07 NOTE — TELEPHONE ENCOUNTER
From: Horacio Sanchez RN  To: Payal Escalante  Sent: 6/7/2019 11:28 AM CDT  Subject: pharmacy    Hi Carina Perez,      Please call our office at [phone] press 1 and then option 4 to speak with a nurse regarding clarification of the pharmacy t

## 2019-06-07 NOTE — TELEPHONE ENCOUNTER
Chart review shows script sent to [facility] [address] for Atorvastatin #90, 2 refills on 4/24/19. Spoke to Jj Tiwari at [facility] who states script was transferred to [facility].  Wilbert Bragg at [facility] they transferred script from [facility] and wi

## 2019-06-07 NOTE — TELEPHONE ENCOUNTER
LMTCB=transfer to triage, please clarify pharmacy that he is using. If patient states that he is using [facility] for everything, will need to refill the atorvastatin and send it to [address] [place] [address].  Both Lisinopril and allopurinol were sent to [facility] on  5/10/19.  And atorva

## 2019-07-13 NOTE — TELEPHONE ENCOUNTER
Please advise in regards to refill request. Thank You  Refill Protocol Appointment Criteria  · Appointment scheduled in the past 12 months or in the next 3 months  Recent Outpatient Visits            2 months ago Physical exam    [address] [facility]

## 2019-07-19 ENCOUNTER — PATIENT MESSAGE (OUTPATIENT)
Dept: FAMILY MEDICINE CLINIC | Facility: CLINIC | Age: 54
End: 2019-07-19

## 2019-07-19 NOTE — TELEPHONE ENCOUNTER
[email or web address] message sent. From: Eugene Ndiaye  To: Veronika Dawn MD  Sent: 7/19/2019  4:03 PM CDT  Subject: Prescription Question    Please kindly contact pharmacy. They claim they have not receive the prescription from your office.  I was just t

## 2019-07-24 ENCOUNTER — PATIENT MESSAGE (OUTPATIENT)
Dept: FAMILY MEDICINE CLINIC | Facility: CLINIC | Age: 54
End: 2019-07-24

## 2019-07-24 NOTE — TELEPHONE ENCOUNTER
From: Nancy Kiser  To: Kb Diaz MD  Sent: 7/24/2019 12:42 PM CDT  Subject: Prescription Question    Hi Oniel Rosenberg - I just called [facility], and they said they are waiting for your reply. Can you please call them can clarify this.  I feel like I a

## 2019-07-24 NOTE — PROGRESS NOTES
Please see My Chart message and advise. Thank you. Spoke with pharmacist who states they do have order for Hydrocortisone Ace-Pramoxine, but insurance is not covering.   Per pharmacist they have faxed this information asking if MD would like a Prior A

## 2019-11-03 RX ORDER — ALLOPURINOL 300 MG/1
TABLET ORAL
Qty: 90 TABLET | Refills: 0 | Status: SHIPPED | OUTPATIENT
Start: 2019-11-03 | End: 2020-01-26

## 2019-11-03 RX ORDER — LISINOPRIL AND HYDROCHLOROTHIAZIDE 20; 12.5 MG/1; MG/1
TABLET ORAL
Qty: 90 TABLET | Refills: 0 | Status: SHIPPED | OUTPATIENT
Start: 2019-11-03 | End: 2020-01-26

## 2019-12-04 NOTE — TELEPHONE ENCOUNTER
Dr please see patient MyChart message and advise regarding request for medication to treat herpes outbreak.  Valacyclovir only found as historical from 2015; pended below for sig/approval.

## 2019-12-05 RX ORDER — VALACYCLOVIR HYDROCHLORIDE 500 MG/1
500 TABLET, FILM COATED ORAL 3 TIMES DAILY
Qty: 30 TABLET | Refills: 0 | Status: SHIPPED | OUTPATIENT
Start: 2019-12-05 | End: 2021-07-07

## 2019-12-06 ENCOUNTER — TELEPHONE (OUTPATIENT)
Dept: FAMILY MEDICINE CLINIC | Facility: CLINIC | Age: 54
End: 2019-12-06

## 2019-12-06 NOTE — TELEPHONE ENCOUNTER
[address] [facility] calling to clarify directions on valacyclovir sent yesterday. Should directions read to take 3 times daily or one time daily. Thank you.

## 2019-12-07 NOTE — TELEPHONE ENCOUNTER
Spoke to pharmacist Trupti Sin and clarified that SIG should be: take one tablet by mouth three times daily. She verbalized understanding.

## 2019-12-10 ENCOUNTER — OFFICE VISIT (OUTPATIENT)
Dept: FAMILY MEDICINE CLINIC | Facility: CLINIC | Age: 54
End: 2019-12-10
Payer: COMMERCIAL

## 2019-12-10 VITALS
TEMPERATURE: 98 F | DIASTOLIC BLOOD PRESSURE: 89 MMHG | SYSTOLIC BLOOD PRESSURE: 142 MMHG | BODY MASS INDEX: 31.24 KG/M2 | HEIGHT: 66 IN | WEIGHT: 194.38 LBS | HEART RATE: 66 BPM

## 2019-12-10 DIAGNOSIS — G47.33 OBSTRUCTIVE SLEEP APNEA SYNDROME: Primary | ICD-10-CM

## 2019-12-10 PROCEDURE — 99213 OFFICE O/P EST LOW 20 MIN: CPT | Performed by: FAMILY MEDICINE

## 2019-12-10 NOTE — PROGRESS NOTES
12/10/2019  10:43 AM    Taylor Muller is a 48year old male. Chief complaint(s): Patient presents with:  Obstructive Sleep Apnea (AYDEE)    HPI:     Taylor Muller primary complaint is regarding sleep apnea.      Patient is a 53-year-old male who • allopurinol 300 MG Oral Tab TAKE ONE TABLET BY MOUTH ONE TIME DAILY 90 tablet 0   • LISINOPRIL-HYDROCHLOROTHIAZIDE 20-12.5 MG Oral Tab TAKE ONE TABLET BY MOUTH ONE TIME DAILY  90 tablet 0   • Hydrocortisone Ace-Pramoxine (ANALPRAM HC) 2.5-1 % Rectal Cr RECOMMENDATIONS given include: Please, call our office with any questions or concerns. Notify Dr Shefali Yanez or the [facility] if there is a deterioration or worsening of the medical condition.  Also, inform the doctor with any new symptoms or medicatio

## 2019-12-16 ENCOUNTER — TELEPHONE (OUTPATIENT)
Dept: FAMILY MEDICINE CLINIC | Facility: CLINIC | Age: 54
End: 2019-12-16

## 2019-12-16 NOTE — TELEPHONE ENCOUNTER
Dr Juan Carlos Jean,     You have entered two referrals for a home sleep study and an in-lab sleep study.  Please advise which study you are ordering    Thank you, Julio

## 2020-01-08 NOTE — TELEPHONE ENCOUNTER
Review pended refill request as it does not fall under a protocol.     Last Rx: 7/13/19 30 g & 2-R  Recent Visits  Date Type Provider Dept   12/10/19 Office Visit MD [facility]   04[facility]

## 2020-01-26 RX ORDER — ALLOPURINOL 300 MG/1
TABLET ORAL
Qty: 90 TABLET | Refills: 1 | Status: SHIPPED | OUTPATIENT
Start: 2020-01-26 | End: 2020-08-20

## 2020-01-26 RX ORDER — LISINOPRIL AND HYDROCHLOROTHIAZIDE 20; 12.5 MG/1; MG/1
TABLET ORAL
Qty: 90 TABLET | Refills: 1 | Status: SHIPPED | OUTPATIENT
Start: 2020-01-26 | End: 2020-08-20

## 2020-01-26 NOTE — TELEPHONE ENCOUNTER
Review pended refill request as it does not fall under a protocol. Last Rx: 11/3/9  LOV: 1 month ago    Refill passed per [facility] protocol.   Hypertensive Medications  Protocol Criteria:  · Appointment scheduled in the past 6 months or in the nex

## 2020-02-23 NOTE — TELEPHONE ENCOUNTER
Sent to provider to review message below from epic:    The combination of a statin with colchicine may result in an increased potential (greater than for each agent alone) for myopathy      Refill passed per [facility], [facility] protocol.   Cholesterol Medicatio

## 2020-02-24 RX ORDER — ATORVASTATIN CALCIUM 20 MG/1
TABLET, FILM COATED ORAL
Qty: 90 TABLET | Refills: 1 | Status: SHIPPED | OUTPATIENT
Start: 2020-02-24 | End: 2020-08-20

## 2020-08-20 ENCOUNTER — PATIENT MESSAGE (OUTPATIENT)
Dept: FAMILY MEDICINE CLINIC | Facility: CLINIC | Age: 55
End: 2020-08-20

## 2020-08-20 RX ORDER — ATORVASTATIN CALCIUM 20 MG/1
TABLET, FILM COATED ORAL
Qty: 90 TABLET | Refills: 0 | Status: SHIPPED | OUTPATIENT
Start: 2020-08-20 | End: 2020-10-13

## 2020-08-20 RX ORDER — ALLOPURINOL 300 MG/1
300 TABLET ORAL DAILY
Qty: 90 TABLET | Refills: 0 | Status: SHIPPED | OUTPATIENT
Start: 2020-08-20 | End: 2020-10-13

## 2020-08-20 RX ORDER — ALLOPURINOL 300 MG/1
TABLET ORAL
Qty: 90 TABLET | Refills: 0 | Status: SHIPPED | OUTPATIENT
Start: 2020-08-20 | End: 2020-10-13

## 2020-08-20 RX ORDER — LISINOPRIL AND HYDROCHLOROTHIAZIDE 20; 12.5 MG/1; MG/1
TABLET ORAL
Qty: 90 TABLET | Refills: 0 | Status: SHIPPED | OUTPATIENT
Start: 2020-08-20 | End: 2020-10-13

## 2020-08-20 RX ORDER — LISINOPRIL AND HYDROCHLOROTHIAZIDE 20; 12.5 MG/1; MG/1
1 TABLET ORAL DAILY
Qty: 90 TABLET | Refills: 0 | Status: SHIPPED | OUTPATIENT
Start: 2020-08-20 | End: 2020-10-13

## 2020-08-20 RX ORDER — COLCHICINE 0.6 MG/1
0.6 TABLET ORAL DAILY
Qty: 30 TABLET | Refills: 0 | Status: SHIPPED | OUTPATIENT
Start: 2020-08-20 | End: 2021-01-26

## 2020-08-20 NOTE — TELEPHONE ENCOUNTER
From: Yong Padron  To: Emilee Gamez MD  Sent: 8/20/2020 11:02 AM CDT  Subject: Prescription Question    Please kindly approved my request for refill of prescription.  I will try to make an appointment for physical soon, maybe in Sept. Can I e-vi

## 2020-08-20 NOTE — TELEPHONE ENCOUNTER
mychart message sent to pt. To reception staff, pls call pt and assisst for appt. TY    No future appointments.

## 2020-08-20 NOTE — TELEPHONE ENCOUNTER
Pt requesting refills Allopurinol, Colchicine, Lisinopril-HCTZ, Atorvastatn, please advise. Pt sent [employer] message as noted below    Note      From: Juan Parker  To: Leticia Sanchez MD  Sent: 8/20/2020 11:02 AM CDT  Subject: Prescription Question    Please kindly approved my request for refill of prescription.  I will try to make an appointment for physical soon, maybe in Sept. Can I e-visit for physical?

## 2020-10-13 ENCOUNTER — LAB ENCOUNTER (OUTPATIENT)
Dept: LAB | Age: 55
End: 2020-10-13
Attending: FAMILY MEDICINE
Payer: COMMERCIAL

## 2020-10-13 ENCOUNTER — OFFICE VISIT (OUTPATIENT)
Dept: FAMILY MEDICINE CLINIC | Facility: CLINIC | Age: 55
End: 2020-10-13
Payer: COMMERCIAL

## 2020-10-13 VITALS
DIASTOLIC BLOOD PRESSURE: 81 MMHG | WEIGHT: 196.25 LBS | HEIGHT: 66 IN | SYSTOLIC BLOOD PRESSURE: 117 MMHG | HEART RATE: 81 BPM | BODY MASS INDEX: 31.54 KG/M2 | TEMPERATURE: 97 F

## 2020-10-13 DIAGNOSIS — Z00.00 PHYSICAL EXAM: ICD-10-CM

## 2020-10-13 DIAGNOSIS — Z00.00 PHYSICAL EXAM: Primary | ICD-10-CM

## 2020-10-13 PROCEDURE — 83036 HEMOGLOBIN GLYCOSYLATED A1C: CPT

## 2020-10-13 PROCEDURE — 99396 PREV VISIT EST AGE 40-64: CPT | Performed by: FAMILY MEDICINE

## 2020-10-13 PROCEDURE — 3079F DIAST BP 80-89 MM HG: CPT | Performed by: FAMILY MEDICINE

## 2020-10-13 PROCEDURE — 36415 COLL VENOUS BLD VENIPUNCTURE: CPT

## 2020-10-13 PROCEDURE — 85025 COMPLETE CBC W/AUTO DIFF WBC: CPT

## 2020-10-13 PROCEDURE — 80061 LIPID PANEL: CPT

## 2020-10-13 PROCEDURE — 3074F SYST BP LT 130 MM HG: CPT | Performed by: FAMILY MEDICINE

## 2020-10-13 PROCEDURE — 84153 ASSAY OF PSA TOTAL: CPT | Performed by: FAMILY MEDICINE

## 2020-10-13 PROCEDURE — 3008F BODY MASS INDEX DOCD: CPT | Performed by: FAMILY MEDICINE

## 2020-10-13 PROCEDURE — 84443 ASSAY THYROID STIM HORMONE: CPT

## 2020-10-13 PROCEDURE — 80053 COMPREHEN METABOLIC PANEL: CPT

## 2020-10-13 PROCEDURE — 82306 VITAMIN D 25 HYDROXY: CPT | Performed by: FAMILY MEDICINE

## 2020-10-13 RX ORDER — ALLOPURINOL 300 MG/1
300 TABLET ORAL DAILY
Qty: 90 TABLET | Refills: 0 | Status: SHIPPED | OUTPATIENT
Start: 2020-10-13 | End: 2020-11-17

## 2020-10-13 RX ORDER — ERGOCALCIFEROL 1.25 MG/1
50000 CAPSULE ORAL WEEKLY
Qty: 12 CAPSULE | Refills: 3 | Status: SHIPPED | OUTPATIENT
Start: 2020-10-13

## 2020-10-13 RX ORDER — ATORVASTATIN CALCIUM 20 MG/1
TABLET, FILM COATED ORAL
Qty: 90 TABLET | Refills: 0 | Status: SHIPPED | OUTPATIENT
Start: 2020-10-13 | End: 2021-01-12

## 2020-10-13 RX ORDER — LISINOPRIL AND HYDROCHLOROTHIAZIDE 20; 12.5 MG/1; MG/1
1 TABLET ORAL DAILY
Qty: 90 TABLET | Refills: 2 | Status: SHIPPED | OUTPATIENT
Start: 2020-10-13 | End: 2021-01-12

## 2020-10-13 NOTE — PROGRESS NOTES
10/13/2020  11:02 AM    Damian Borden is a 50year old male. Chief complaint(s): Patient presents with:  Physical: annual medicare physical exam and labwork     HPI:     Damian Borden primary complaint is regarding CPE.      Damian Borden Medications (Active prior to today's visit):  Current Outpatient Medications   Medication Sig Dispense Refill   • Lisinopril-hydroCHLOROthiazide 20-12.5 MG Oral Tab Take 1 tablet by mouth daily.  90 tablet 2   • allopurinol 300 MG Oral Tab Take 1 tablet Constitutional: He appears well-developed and well-nourished. HENT:   Normal cephalic, normal light red reflex; pupils equally reactive to light and accomodation bilaterally; clear sclera without icteric.   Normal tympanic membranes, normal light r Urinalysis, Routine [E]      Urine Microscopic w Reflex CULTURE      Vitamin D, 25-Hydroxy [E]    Referrals: None  In-House; Urine dip. Test/Procedures done today include: EKG. IMMUNIZATIONS: none given today.     RECOMMENDATIONS given include: ANTICIPA • atorvastatin 20 MG Oral Tab 90 tablet 0     Sig: TAKE ONE TABLET BY MOUTH ONE TIME DAILY IN THE EVENING   • ergocalciferol 1.25 MG (67758 UT) Oral Cap 12 capsule 3     Sig: Take 1 capsule (50,000 Units total) by mouth once a week.        Imaging & Refer

## 2020-10-14 RX ORDER — ERGOCALCIFEROL 1.25 MG/1
50000 CAPSULE ORAL WEEKLY
Qty: 12 CAPSULE | Refills: 4 | Status: SHIPPED | OUTPATIENT
Start: 2020-10-14 | End: 2020-11-13

## 2020-10-24 NOTE — PROGRESS NOTES
Results reviewed by patient    Viewed by [facility] on 10/19/2020  4:23 PM  Written by Black Ramos RN on 10/19/2020  2:01 PM

## 2020-11-12 ENCOUNTER — OFFICE VISIT (OUTPATIENT)
Dept: SLEEP CENTER | Age: 55
End: 2020-11-12
Attending: FAMILY MEDICINE
Payer: COMMERCIAL

## 2020-11-12 DIAGNOSIS — G47.33 OBSTRUCTIVE SLEEP APNEA SYNDROME: ICD-10-CM

## 2020-11-12 DIAGNOSIS — Z76.89 SLEEP CONCERN: Primary | ICD-10-CM

## 2020-11-12 PROCEDURE — 95810 POLYSOM 6/> YRS 4/> PARAM: CPT

## 2020-11-16 ENCOUNTER — TELEPHONE (OUTPATIENT)
Dept: FAMILY MEDICINE CLINIC | Facility: CLINIC | Age: 55
End: 2020-11-16

## 2020-11-16 DIAGNOSIS — G47.33 OSA (OBSTRUCTIVE SLEEP APNEA): Primary | ICD-10-CM

## 2020-11-16 NOTE — PROCEDURES
[facility]  Accredited by the [facility] ([facility])    PATIENT'S NAME: Marco Rishi   ATTENDING PHYSICIAN: Yady Campbell MD   REFERRING PHYSICIAN: Yady Campbell MD   PATIENT ACCOUNT #: [id number] LOCATION: spontaneous arousal index is 10.1 events per hour for a combined arousal index of 24.1 events per hour. There were no significant periodic limb movements. The lowest desaturation was to 82%.   The average heart rate is 62 beats per minute, and there was n

## 2020-11-16 NOTE — TELEPHONE ENCOUNTER
[place]/  of the Sleep Center states she received home sleep study from Dr. Allen Nunez with comment 'CPAP Titration study'. However, Susie Turner states this is incorrect and is requesting that order be updated to Titration Sleep Study.      Fax# 3

## 2020-11-17 RX ORDER — ALLOPURINOL 300 MG/1
TABLET ORAL
Qty: 90 TABLET | Refills: 0 | Status: SHIPPED | OUTPATIENT
Start: 2020-11-17 | End: 2021-01-12

## 2020-11-19 ENCOUNTER — ORDER TRANSCRIPTION (OUTPATIENT)
Dept: SLEEP CENTER | Age: 55
End: 2020-11-19

## 2020-11-19 DIAGNOSIS — G47.33 OBSTRUCTIVE SLEEP APNEA (ADULT) (PEDIATRIC): Primary | ICD-10-CM

## 2020-12-01 ENCOUNTER — PATIENT MESSAGE (OUTPATIENT)
Dept: FAMILY MEDICINE CLINIC | Facility: CLINIC | Age: 55
End: 2020-12-01

## 2020-12-01 DIAGNOSIS — G47.33 OBSTRUCTIVE SLEEP APNEA SYNDROME: Primary | ICD-10-CM

## 2020-12-02 NOTE — TELEPHONE ENCOUNTER
Left message for patient, please inform patient of sleep study not processed by managed care since the test was ordered on 12/10/2019. The insurance on file was Mal Marshall which  on 2019.  Patient provided clinic with new insurance information on 10

## 2020-12-02 NOTE — TELEPHONE ENCOUNTER
From: Michael Rojas  To: Ann Marie Page MD  Sent: 12/1/2020 8:31 PM CST  Subject: Visit Follow-up Question    Received a letter yesterday from insurance, but dated Nov 12. It states that sleep test is not approved because not medical necessity.  The

## 2020-12-03 NOTE — TELEPHONE ENCOUNTER
Triage support please follow up the sleep department tomorrow. This was sent in a staff message    Received: Today  Message MD NAINA Saavedra Em Rn Triage;  Sejal Segura, ABRAHAM             Try to get authorization, unable to speak with

## 2020-12-04 NOTE — TELEPHONE ENCOUNTER
Patient has new insurance and once he calls the sleep center to update his insurance they will see if a new order is needed

## 2021-01-13 RX ORDER — LISINOPRIL AND HYDROCHLOROTHIAZIDE 20; 12.5 MG/1; MG/1
1 TABLET ORAL DAILY
Qty: 90 TABLET | Refills: 2 | Status: SHIPPED | OUTPATIENT
Start: 2021-01-13 | End: 2021-09-12

## 2021-01-13 RX ORDER — ATORVASTATIN CALCIUM 20 MG/1
TABLET, FILM COATED ORAL
Qty: 90 TABLET | Refills: 0 | Status: SHIPPED | OUTPATIENT
Start: 2021-01-13 | End: 2021-05-02

## 2021-01-13 RX ORDER — ALLOPURINOL 300 MG/1
300 TABLET ORAL DAILY
Qty: 90 TABLET | Refills: 0 | Status: SHIPPED | OUTPATIENT
Start: 2021-01-13 | End: 2021-02-14

## 2021-01-26 RX ORDER — COLCHICINE 0.6 MG/1
0.6 TABLET ORAL DAILY
Qty: 30 TABLET | Refills: 0 | Status: SHIPPED | OUTPATIENT
Start: 2021-01-26 | End: 2021-08-09

## 2021-02-15 RX ORDER — ALLOPURINOL 300 MG/1
300 TABLET ORAL DAILY
Qty: 90 TABLET | Refills: 0 | Status: SHIPPED | OUTPATIENT
Start: 2021-02-15 | End: 2021-06-06

## 2021-02-22 ENCOUNTER — ORDER TRANSCRIPTION (OUTPATIENT)
Dept: SLEEP CENTER | Age: 56
End: 2021-02-22

## 2021-02-22 DIAGNOSIS — G47.33 OBSTRUCTIVE SLEEP APNEA (ADULT) (PEDIATRIC): Primary | ICD-10-CM

## 2021-03-19 ENCOUNTER — IMMUNIZATION (OUTPATIENT)
Dept: LAB | Age: 56
End: 2021-03-19
Attending: HOSPITALIST
Payer: COMMERCIAL

## 2021-03-19 DIAGNOSIS — Z23 NEED FOR VACCINATION: Primary | ICD-10-CM

## 2021-03-19 PROCEDURE — 0001A SARSCOV2 VAC 30MCG/0.3ML IM: CPT

## 2021-03-27 ENCOUNTER — LAB ENCOUNTER (OUTPATIENT)
Dept: LAB | Age: 56
End: 2021-03-27
Attending: FAMILY MEDICINE
Payer: COMMERCIAL

## 2021-03-27 DIAGNOSIS — G47.33 OBSTRUCTIVE SLEEP APNEA (ADULT) (PEDIATRIC): ICD-10-CM

## 2021-03-27 LAB — SARS-COV-2 RNA RESP QL NAA+PROBE: NOT DETECTED

## 2021-03-30 ENCOUNTER — OFFICE VISIT (OUTPATIENT)
Dept: SLEEP CENTER | Age: 56
End: 2021-03-30
Attending: FAMILY MEDICINE
Payer: COMMERCIAL

## 2021-03-30 DIAGNOSIS — Z76.89 SLEEP CONCERN: Primary | ICD-10-CM

## 2021-03-30 DIAGNOSIS — G47.33 OBSTRUCTIVE SLEEP APNEA SYNDROME: ICD-10-CM

## 2021-03-30 PROCEDURE — 95811 POLYSOM 6/>YRS CPAP 4/> PARM: CPT

## 2021-04-02 NOTE — PROCEDURES
[address] [place] [address]  Accredited by the [facility] ([facility])    PATIENT'S NAME: Delfinojigar Noer   ATTENDING PHYSICIAN: Nicolette Manzo MD   REFERRING PHYSICIAN: Nicolette Manzo MD   PATIENT ACCOUNT #: [id number] LOCATION: significant periodic limb movements. The average heart rate was 65 beats per minute.   CPAP was initiated at 5 CWP and titrated upward to a final value of 11 CWP during which the apnea plus hypopnea index fell to 3.2 events per hour and the lowest desatura

## 2021-04-05 ENCOUNTER — TELEPHONE (OUTPATIENT)
Dept: FAMILY MEDICINE CLINIC | Facility: CLINIC | Age: 56
End: 2021-04-05

## 2021-04-07 ENCOUNTER — PATIENT MESSAGE (OUTPATIENT)
Dept: FAMILY MEDICINE CLINIC | Facility: CLINIC | Age: 56
End: 2021-04-07

## 2021-04-07 NOTE — TELEPHONE ENCOUNTER
From: Taran Dupree  To: Jeannette Fletcher MD  Sent: 4/7/2021 3:48 PM CDT  Subject: Non-Urgent Medical Question    How do I go about getting g the CPAP machine?

## 2021-04-09 ENCOUNTER — IMMUNIZATION (OUTPATIENT)
Dept: LAB | Age: 56
End: 2021-04-09
Attending: HOSPITALIST
Payer: COMMERCIAL

## 2021-04-09 DIAGNOSIS — Z23 NEED FOR VACCINATION: Primary | ICD-10-CM

## 2021-04-09 PROCEDURE — 0002A SARSCOV2 VAC 30MCG/0.3ML IM: CPT

## 2021-04-14 ENCOUNTER — TELEPHONE (OUTPATIENT)
Dept: FAMILY MEDICINE CLINIC | Facility: CLINIC | Age: 56
End: 2021-04-14

## 2021-04-14 NOTE — TELEPHONE ENCOUNTER
Spoke with patient ( verified) RE: Radha message:    Patient reports he did work out a few weeks ago, then started to feel low back pain and intermittent numbness/tingling to left leg.  Patient able to ambulate independently, denies bowel or bladder dy

## 2021-04-14 NOTE — TELEPHONE ENCOUNTER
----- Message from Ruth Velez sent at 4/14/2021 10:57 AM CDT -----  Regarding: Referral Request  Contact: [phone]  I am having back aches. .. I scheduled appt for Monday April 19. Should I schedule with chiropractor also?     Thanks

## 2021-04-15 ENCOUNTER — TELEPHONE (OUTPATIENT)
Dept: FAMILY MEDICINE CLINIC | Facility: CLINIC | Age: 56
End: 2021-04-15

## 2021-04-15 NOTE — TELEPHONE ENCOUNTER
Joe Shaver called to state that pt [facility] is not in network and that Dr. Whit Branch has to send pts paper work to either [place] [facility], or [place] [address]. We can't take it because we won't get paid. She said she has already let patient know.

## 2021-04-15 NOTE — TELEPHONE ENCOUNTER
Patient returned call  Advised him of Dr. Arthur Gonzalez note below  He prefers to keep appointment for 04/19

## 2021-04-19 ENCOUNTER — OFFICE VISIT (OUTPATIENT)
Dept: FAMILY MEDICINE CLINIC | Facility: CLINIC | Age: 56
End: 2021-04-19
Payer: COMMERCIAL

## 2021-04-19 ENCOUNTER — HOSPITAL ENCOUNTER (OUTPATIENT)
Dept: GENERAL RADIOLOGY | Age: 56
Discharge: HOME | End: 2021-04-19
Attending: FAMILY MEDICINE
Payer: COMMERCIAL

## 2021-04-19 ENCOUNTER — LAB ENCOUNTER (OUTPATIENT)
Dept: LAB | Age: 56
End: 2021-04-19
Attending: FAMILY MEDICINE
Payer: COMMERCIAL

## 2021-04-19 VITALS
SYSTOLIC BLOOD PRESSURE: 114 MMHG | WEIGHT: 212 LBS | HEIGHT: 66 IN | HEART RATE: 72 BPM | BODY MASS INDEX: 34.07 KG/M2 | DIASTOLIC BLOOD PRESSURE: 75 MMHG

## 2021-04-19 DIAGNOSIS — R10.9 ABDOMINAL CRAMPS: ICD-10-CM

## 2021-04-19 DIAGNOSIS — M54.42 CHRONIC LEFT-SIDED LOW BACK PAIN WITH LEFT-SIDED SCIATICA: ICD-10-CM

## 2021-04-19 DIAGNOSIS — R10.9 ABDOMINAL CRAMPS: Primary | ICD-10-CM

## 2021-04-19 DIAGNOSIS — G89.29 CHRONIC LEFT-SIDED LOW BACK PAIN WITH LEFT-SIDED SCIATICA: ICD-10-CM

## 2021-04-19 DIAGNOSIS — L91.8 SKIN TAG: ICD-10-CM

## 2021-04-19 PROCEDURE — 3074F SYST BP LT 130 MM HG: CPT | Performed by: FAMILY MEDICINE

## 2021-04-19 PROCEDURE — 36415 COLL VENOUS BLD VENIPUNCTURE: CPT

## 2021-04-19 PROCEDURE — 3078F DIAST BP <80 MM HG: CPT | Performed by: FAMILY MEDICINE

## 2021-04-19 PROCEDURE — 3008F BODY MASS INDEX DOCD: CPT | Performed by: FAMILY MEDICINE

## 2021-04-19 PROCEDURE — 80053 COMPREHEN METABOLIC PANEL: CPT

## 2021-04-19 PROCEDURE — 72110 X-RAY EXAM L-2 SPINE 4/>VWS: CPT | Performed by: FAMILY MEDICINE

## 2021-04-19 PROCEDURE — 99214 OFFICE O/P EST MOD 30 MIN: CPT | Performed by: FAMILY MEDICINE

## 2021-04-19 RX ORDER — IBUPROFEN 600 MG/1
600 TABLET ORAL EVERY 6 HOURS PRN
Qty: 60 TABLET | Refills: 0 | Status: SHIPPED | OUTPATIENT
Start: 2021-04-19 | End: 2021-07-07

## 2021-04-19 NOTE — PROGRESS NOTES
4/19/2021  9:56 AM    Payal Escalante is a 49year old male.     Chief complaint(s): Patient presents with:  Low Back Pain: c/o low back pain   Abdominal Pain: abdominal spasm randomly   Derm Problem: skin tag     HPI:     Payal Escalante primary comp Very infrequently    Drug use: No       Immunizations:     Immunization History  Administered            Date(s) Administered    Covid-19 Vaccine Pfizer 30 mcg/0.3 ml                          03/19/2021 04/09/2021      Flublok Quad Influenza Vaccine (9211 Gastrointestinal: Positive for abdominal pain (abdominal wall cramps). Musculoskeletal: Positive for back pain. Negative for myalgias. Skin: Negative for rash. Skin tags   Neurological: Negative for dizziness, weakness and headaches.        PHY answered. Patient was informed to please, call our office with any new or further questions or concerns that may come up in the near future. Notify Dr Shefali Yanez or the [facility] if there is a deterioration or worsening of the medical condition.  Also,

## 2021-04-20 ENCOUNTER — TELEPHONE (OUTPATIENT)
Dept: FAMILY MEDICINE CLINIC | Facility: CLINIC | Age: 56
End: 2021-04-20

## 2021-04-20 NOTE — TELEPHONE ENCOUNTER
Talked to patient told him message below, understood and he will call back to notify office where to send the Order.

## 2021-04-20 NOTE — TELEPHONE ENCOUNTER
Spoke to patient regarding message, patient states [facility] is not in-network with insurance plan. Order needs to be sent to [facility]. Demographics, Insurance, DME order, results and office notes faxed to deborah [phone].         Subject: cpap provier

## 2021-04-20 NOTE — TELEPHONE ENCOUNTER
Patient was seen yesterday by MD message was clarified. Per pt he needs us to order his DME supplies for CPAP machine be sent to [place] [facility] [place] or [address]. Please call patient to provide information to either place if possible.

## 2021-04-20 NOTE — TELEPHONE ENCOUNTER
Ralph Barrett  Patient Customer Service Request Pool 37 minutes ago (11:07 AM)     Topic: Selection     Rx help desk number for [facility] is

## 2021-04-30 ENCOUNTER — OFFICE VISIT (OUTPATIENT)
Dept: FAMILY MEDICINE CLINIC | Facility: CLINIC | Age: 56
End: 2021-04-30
Payer: COMMERCIAL

## 2021-04-30 VITALS
HEART RATE: 79 BPM | HEIGHT: 66 IN | DIASTOLIC BLOOD PRESSURE: 77 MMHG | SYSTOLIC BLOOD PRESSURE: 114 MMHG | BODY MASS INDEX: 33.17 KG/M2 | WEIGHT: 206.38 LBS

## 2021-04-30 DIAGNOSIS — L91.8 SKIN TAG: Primary | ICD-10-CM

## 2021-04-30 PROCEDURE — 11200 RMVL SKIN TAGS UP TO&INC 15: CPT | Performed by: FAMILY MEDICINE

## 2021-04-30 PROCEDURE — 3074F SYST BP LT 130 MM HG: CPT | Performed by: FAMILY MEDICINE

## 2021-04-30 PROCEDURE — 3078F DIAST BP <80 MM HG: CPT | Performed by: FAMILY MEDICINE

## 2021-04-30 PROCEDURE — 3008F BODY MASS INDEX DOCD: CPT | Performed by: FAMILY MEDICINE

## 2021-04-30 NOTE — PROGRESS NOTES
4/30/2021  10:05 AM    Damian Borden is a 49year old male.     Chief complaint(s): Patient presents with:  Procedure: Removal of Skin Tag on right side of neck     HPI:     Damian Borden primary complai iron due to the loss of their sinus t is re 04/24/2019      Medications (Active prior to today's visit):  Current Outpatient Medications   Medication Sig Dispense Refill   • ibuprofen 600 MG Oral Tab Take 1 tablet (600 mg total) by mouth every 6 (six) hours as needed for Pain.  Always take it with fo Benign appearing lesion #1 is a skin tags (Dx), located on right neck. The method of removal is by excision. Anesthesia was obtained with 1.5 cc of 2% lidocaine with epinephrine. Hemostasis is achieved with application of pressure and hyfercation. Visit:  No orders of the defined types were placed in this encounter.       Meds This Visit:  Requested Prescriptions      No prescriptions requested or ordered in this encounter       Imaging & Referrals:  None         Darius Khan MD

## 2021-05-03 RX ORDER — ATORVASTATIN CALCIUM 20 MG/1
TABLET, FILM COATED ORAL
Qty: 90 TABLET | Refills: 0 | Status: SHIPPED | OUTPATIENT
Start: 2021-05-03 | End: 2021-07-27

## 2021-05-11 ENCOUNTER — PATIENT MESSAGE (OUTPATIENT)
Dept: FAMILY MEDICINE CLINIC | Facility: CLINIC | Age: 56
End: 2021-05-11

## 2021-05-11 NOTE — TELEPHONE ENCOUNTER
From: Nancy Kiser  To: Elizabeth Malcolm MD  Sent: 5/11/2021 10:25 AM CDT  Subject: Prescription Question    Please kindly contact [address] [facility] for status of CPAP order [phone].   I called them for status, but they claim they are still waiting for

## 2021-05-12 NOTE — TELEPHONE ENCOUNTER
[facility], requesting Face to face notes when AYDEE was discussed. Office notes from 12/10/2019 faxed to [phone].

## 2021-05-13 NOTE — TELEPHONE ENCOUNTER
Norbert message viewed by patient.      From   Rhonda Jasso To   Ana Yoo and Delivered   5/12/2021  9:28 AM   Last Read in [address]   5/12/2021  1:34 PM by Skylar Garrett

## 2021-06-06 RX ORDER — ALLOPURINOL 300 MG/1
300 TABLET ORAL DAILY
Qty: 90 TABLET | Refills: 0 | Status: SHIPPED | OUTPATIENT
Start: 2021-06-06 | End: 2021-12-11

## 2021-06-16 ENCOUNTER — NURSE TRIAGE (OUTPATIENT)
Dept: FAMILY MEDICINE CLINIC | Facility: CLINIC | Age: 56
End: 2021-06-16

## 2021-06-16 ENCOUNTER — PATIENT MESSAGE (OUTPATIENT)
Dept: FAMILY MEDICINE CLINIC | Facility: CLINIC | Age: 56
End: 2021-06-16

## 2021-06-16 ENCOUNTER — OFFICE VISIT (OUTPATIENT)
Dept: FAMILY MEDICINE CLINIC | Facility: CLINIC | Age: 56
End: 2021-06-16
Payer: COMMERCIAL

## 2021-06-16 VITALS
SYSTOLIC BLOOD PRESSURE: 134 MMHG | BODY MASS INDEX: 33.27 KG/M2 | DIASTOLIC BLOOD PRESSURE: 92 MMHG | HEIGHT: 66 IN | WEIGHT: 207 LBS | HEART RATE: 88 BPM

## 2021-06-16 DIAGNOSIS — G89.29 CHRONIC BILATERAL LOW BACK PAIN WITH LEFT-SIDED SCIATICA: Primary | ICD-10-CM

## 2021-06-16 DIAGNOSIS — M54.42 CHRONIC BILATERAL LOW BACK PAIN WITH LEFT-SIDED SCIATICA: Primary | ICD-10-CM

## 2021-06-16 PROCEDURE — 3080F DIAST BP >= 90 MM HG: CPT | Performed by: NURSE PRACTITIONER

## 2021-06-16 PROCEDURE — 99214 OFFICE O/P EST MOD 30 MIN: CPT | Performed by: NURSE PRACTITIONER

## 2021-06-16 PROCEDURE — 96372 THER/PROPH/DIAG INJ SC/IM: CPT | Performed by: NURSE PRACTITIONER

## 2021-06-16 PROCEDURE — 3008F BODY MASS INDEX DOCD: CPT | Performed by: NURSE PRACTITIONER

## 2021-06-16 PROCEDURE — 3075F SYST BP GE 130 - 139MM HG: CPT | Performed by: NURSE PRACTITIONER

## 2021-06-16 RX ORDER — CYCLOBENZAPRINE HCL 10 MG
10 TABLET ORAL 3 TIMES DAILY
Qty: 30 TABLET | Refills: 1 | Status: SHIPPED | OUTPATIENT
Start: 2021-06-16 | End: 2021-06-16

## 2021-06-16 RX ORDER — METHYLPREDNISOLONE 4 MG/1
TABLET ORAL
Qty: 1 EACH | Refills: 0 | Status: SHIPPED | OUTPATIENT
Start: 2021-06-16 | End: 2021-07-07

## 2021-06-16 RX ORDER — KETOROLAC TROMETHAMINE 30 MG/ML
30 INJECTION, SOLUTION INTRAMUSCULAR; INTRAVENOUS ONCE
Status: COMPLETED | OUTPATIENT
Start: 2021-06-16 | End: 2021-06-16

## 2021-06-16 RX ORDER — CYCLOBENZAPRINE HCL 10 MG
10 TABLET ORAL 3 TIMES DAILY PRN
Qty: 30 TABLET | Refills: 0 | Status: SHIPPED | OUTPATIENT
Start: 2021-06-16 | End: 2021-07-06

## 2021-06-16 RX ADMIN — KETOROLAC TROMETHAMINE 30 MG: 30 INJECTION, SOLUTION INTRAMUSCULAR; INTRAVENOUS at 16:16:00

## 2021-06-16 NOTE — TELEPHONE ENCOUNTER
From: Mary Beth Serrano  To: Jermaine Smart MD  Sent: 6/16/2021 8:41 AM CDT  Subject: Test Results Question    Please kindly send xray and mri taken on April 19, 2021 to chiropractor Dr. Yumiko Clark, tel. [phone].     Thanks

## 2021-06-16 NOTE — PROGRESS NOTES
HPI    Patient presents for intermittent left leg pain x 1 week. Pain started in back and now has moved and shoots down leg. With a longstanding history of low back pain. Had a manipulation with a chiropractor yesterday and today pain is worsening. Sexual activity: Not on file    Other Topics      Concerns:        Not on file    Social History Narrative      Not on file    Social Determinants of Health  Financial Resource Strain:       Difficulty of Paying Living Expenses:   Food Insecurity:       Wo valACYclovir HCl 500 MG Oral Tab Take 1 tablet (500 mg total) by mouth 3 (three) times daily. Take one tablet once a day 30 tablet 0   • Hydrocortisone Ace-Pramoxine (ANALPRAM HC) 2.5-1 % Rectal Cream Place 1 Application rectally 2 (two) times daily.  (Rae dose pack as directed, flexeril tid prn. Discussed plan of care with patient and patient is in agreement. All questions answered. Patient to call with questions or concerns. Encouraged to sign up for My Chart if not already registered.

## 2021-06-16 NOTE — TELEPHONE ENCOUNTER
Action Requested: Summary for Provider     []  Critical Lab, Recommendations Needed  [] Need Additional Advice  []   FYI    []   Need Orders  [] Need Medications Sent to Pharmacy  []  Other     SUMMARY: OV scheduled today with Mone MARINA.     Pt report

## 2021-06-16 NOTE — TELEPHONE ENCOUNTER
From: Madhavi White  To: Willie Rausch MD  Sent: 6/16/2021 8:11 AM CDT  Subject: Other    Can I get cortisone shot today? I'm having severe pain on my left leg due to sciatica. Not able to sleep.

## 2021-06-17 ENCOUNTER — TELEPHONE (OUTPATIENT)
Dept: PHYSICAL THERAPY | Facility: HOSPITAL | Age: 56
End: 2021-06-17

## 2021-07-04 ENCOUNTER — PATIENT MESSAGE (OUTPATIENT)
Dept: FAMILY MEDICINE CLINIC | Facility: CLINIC | Age: 56
End: 2021-07-04

## 2021-07-05 ENCOUNTER — NURSE TRIAGE (OUTPATIENT)
Dept: FAMILY MEDICINE CLINIC | Facility: CLINIC | Age: 56
End: 2021-07-05

## 2021-07-05 DIAGNOSIS — M54.42 CHRONIC BILATERAL LOW BACK PAIN WITH LEFT-SIDED SCIATICA: ICD-10-CM

## 2021-07-05 DIAGNOSIS — G89.29 CHRONIC BILATERAL LOW BACK PAIN WITH LEFT-SIDED SCIATICA: ICD-10-CM

## 2021-07-05 RX ORDER — CYCLOBENZAPRINE HCL 10 MG
10 TABLET ORAL 3 TIMES DAILY PRN
Qty: 30 TABLET | Refills: 0 | OUTPATIENT
Start: 2021-07-05

## 2021-07-05 RX ORDER — METHYLPREDNISOLONE 4 MG/1
TABLET ORAL
Qty: 1 EACH | Refills: 0 | OUTPATIENT
Start: 2021-07-05

## 2021-07-05 NOTE — TELEPHONE ENCOUNTER
----- Message from Michael Rojas sent at 7/4/2021 12:53 PM CDT -----  Regarding: Prescription Question  Contact: [phone]  Can I have a refill on the Medrol?  My left sciatica is much better, but unfortunately my right side is now suffering from Sc

## 2021-07-06 ENCOUNTER — PATIENT MESSAGE (OUTPATIENT)
Dept: FAMILY MEDICINE CLINIC | Facility: CLINIC | Age: 56
End: 2021-07-06

## 2021-07-06 DIAGNOSIS — M54.42 CHRONIC BILATERAL LOW BACK PAIN WITH LEFT-SIDED SCIATICA: ICD-10-CM

## 2021-07-06 DIAGNOSIS — G89.29 CHRONIC BILATERAL LOW BACK PAIN WITH LEFT-SIDED SCIATICA: ICD-10-CM

## 2021-07-06 RX ORDER — CYCLOBENZAPRINE HCL 10 MG
10 TABLET ORAL 3 TIMES DAILY PRN
Qty: 30 TABLET | Refills: 0 | Status: SHIPPED | OUTPATIENT
Start: 2021-07-06 | End: 2021-08-09

## 2021-07-06 RX ORDER — METHYLPREDNISOLONE 4 MG/1
TABLET ORAL
Qty: 1 EACH | Refills: 0 | Status: CANCELLED | OUTPATIENT
Start: 2021-07-06

## 2021-07-06 NOTE — TELEPHONE ENCOUNTER
Left message to call back   Patient has read MyChart message  RE: Prescription Question  Message [id number]  From   Bijan Duran RN To   Tyrone Woodruff and Delivered   7/5/2021  9:09 AM   Last Read in [address]   7/6/2021  9:07 AM by Brittany Mcconnell

## 2021-07-06 NOTE — TELEPHONE ENCOUNTER
Pt following-up on requests. Pt states he also requested for cyclobenzaprine. [facility] for [facility]. Please advise.

## 2021-07-06 NOTE — TELEPHONE ENCOUNTER
Action Requested: Summary for Provider     []  Critical Lab, Recommendations Needed  [x] Need Additional Advice  []   FYI    []   Need Orders  [x] Need Medications Sent to Pharmacy  []  Other     SUMMARY: Sciatica on right leg; requesting steroid pack    P

## 2021-07-06 NOTE — TELEPHONE ENCOUNTER
Sent in flexeril per request to use as needed. Too soon for another medrol dose pack. Should follow up for PT and with physiatry as discussed at [facility] for assessment and further recommendations.

## 2021-07-07 ENCOUNTER — NURSE TRIAGE (OUTPATIENT)
Dept: FAMILY MEDICINE CLINIC | Facility: CLINIC | Age: 56
End: 2021-07-07

## 2021-07-07 ENCOUNTER — PATIENT MESSAGE (OUTPATIENT)
Dept: FAMILY MEDICINE CLINIC | Facility: CLINIC | Age: 56
End: 2021-07-07

## 2021-07-07 ENCOUNTER — OFFICE VISIT (OUTPATIENT)
Dept: FAMILY MEDICINE CLINIC | Facility: CLINIC | Age: 56
End: 2021-07-07
Payer: COMMERCIAL

## 2021-07-07 VITALS
BODY MASS INDEX: 32.71 KG/M2 | HEIGHT: 66 IN | WEIGHT: 203.5 LBS | HEART RATE: 109 BPM | RESPIRATION RATE: 16 BRPM | DIASTOLIC BLOOD PRESSURE: 87 MMHG | SYSTOLIC BLOOD PRESSURE: 138 MMHG

## 2021-07-07 DIAGNOSIS — M54.41 CHRONIC LOW BACK PAIN WITH RIGHT-SIDED SCIATICA, UNSPECIFIED BACK PAIN LATERALITY: Primary | ICD-10-CM

## 2021-07-07 DIAGNOSIS — G89.29 CHRONIC LOW BACK PAIN WITH RIGHT-SIDED SCIATICA, UNSPECIFIED BACK PAIN LATERALITY: Primary | ICD-10-CM

## 2021-07-07 PROCEDURE — 96372 THER/PROPH/DIAG INJ SC/IM: CPT | Performed by: NURSE PRACTITIONER

## 2021-07-07 PROCEDURE — 3079F DIAST BP 80-89 MM HG: CPT | Performed by: NURSE PRACTITIONER

## 2021-07-07 PROCEDURE — 99214 OFFICE O/P EST MOD 30 MIN: CPT | Performed by: NURSE PRACTITIONER

## 2021-07-07 PROCEDURE — 3075F SYST BP GE 130 - 139MM HG: CPT | Performed by: NURSE PRACTITIONER

## 2021-07-07 PROCEDURE — 3008F BODY MASS INDEX DOCD: CPT | Performed by: NURSE PRACTITIONER

## 2021-07-07 RX ORDER — GABAPENTIN 100 MG/1
100 CAPSULE ORAL 3 TIMES DAILY
Qty: 90 CAPSULE | Refills: 0 | Status: SHIPPED | OUTPATIENT
Start: 2021-07-07

## 2021-07-07 RX ORDER — KETOROLAC TROMETHAMINE 10 MG/1
10 TABLET, FILM COATED ORAL EVERY 6 HOURS PRN
Qty: 30 TABLET | Refills: 0 | Status: SHIPPED | OUTPATIENT
Start: 2021-07-07

## 2021-07-07 RX ORDER — KETOROLAC TROMETHAMINE 30 MG/ML
30 INJECTION, SOLUTION INTRAMUSCULAR; INTRAVENOUS ONCE
Status: COMPLETED | OUTPATIENT
Start: 2021-07-07 | End: 2021-07-07

## 2021-07-07 RX ADMIN — KETOROLAC TROMETHAMINE 30 MG: 30 INJECTION, SOLUTION INTRAMUSCULAR; INTRAVENOUS at 11:48:00

## 2021-07-07 NOTE — PROGRESS NOTES
HPI    Patient presents for right sciatica pain x 3 weeks. Seen on 6/16 for chronic low back pain with left sided sciatica. Patient states that left sided sciatica has resolved but now is present on the left side.   Pain starts in hip and radiates down to Sexual activity: Not on file    Other Topics      Concerns:        Not on file    Social History Narrative      Not on file    Social Determinants of Health  Financial Resource Strain:       Difficulty of Paying Living Expenses:   Food Insecurity:       Wo capsule 3       Allergies:  No Known Allergies    Physical Exam  Vitals and nursing note reviewed. Constitutional:       General: He is not in acute distress. Appearance: Normal appearance. He is not ill-appearing, toxic-appearing or diaphoretic.    H

## 2021-07-07 NOTE — TELEPHONE ENCOUNTER
From: Ruth Velez  To: DEEPAK Hinojosa  Sent: 7/6/2021 8:54 PM CDT  Subject: Prescription Question    Can you please approve my refill for the Medrol also?     Thanks

## 2021-07-07 NOTE — TELEPHONE ENCOUNTER
Action Requested: Summary for Provider     []  Critical Lab, Recommendations Needed  [] Need Additional Advice  []   FYI    []   Need Orders  [] Need Medications Sent to Pharmacy  []  Other     SUMMARY: Pt c/o right hip pain that radiates down the side o

## 2021-07-08 ENCOUNTER — PATIENT MESSAGE (OUTPATIENT)
Dept: FAMILY MEDICINE CLINIC | Facility: CLINIC | Age: 56
End: 2021-07-08

## 2021-07-08 RX ORDER — METHYLPREDNISOLONE 4 MG/1
TABLET ORAL
Qty: 1 EACH | Refills: 0 | OUTPATIENT
Start: 2021-07-08

## 2021-07-08 RX ORDER — METHYLPREDNISOLONE 4 MG/1
TABLET ORAL
Qty: 21 TABLET | Refills: 0 | OUTPATIENT
Start: 2021-07-08

## 2021-07-09 NOTE — TELEPHONE ENCOUNTER
From: Fernando Garcia  To: Dustin Schneider MD  Sent: 7/8/2021 6:49 PM CDT  Subject: Referral Request    I finally got my CPAP machine yesterday. And part of the insurance requirements is to have follow up with  After 30 days.  Who should I make the

## 2021-07-12 ENCOUNTER — OFFICE VISIT (OUTPATIENT)
Dept: PHYSICAL THERAPY | Age: 56
End: 2021-07-12
Attending: NURSE PRACTITIONER
Payer: COMMERCIAL

## 2021-07-12 DIAGNOSIS — M54.42 CHRONIC BILATERAL LOW BACK PAIN WITH LEFT-SIDED SCIATICA: ICD-10-CM

## 2021-07-12 DIAGNOSIS — G89.29 CHRONIC BILATERAL LOW BACK PAIN WITH LEFT-SIDED SCIATICA: ICD-10-CM

## 2021-07-12 PROCEDURE — 97110 THERAPEUTIC EXERCISES: CPT | Performed by: PHYSICAL THERAPIST

## 2021-07-12 PROCEDURE — 97161 PT EVAL LOW COMPLEX 20 MIN: CPT | Performed by: PHYSICAL THERAPIST

## 2021-07-12 PROCEDURE — 97530 THERAPEUTIC ACTIVITIES: CPT | Performed by: PHYSICAL THERAPIST

## 2021-07-12 NOTE — PROGRESS NOTES
SPINE EVALUATION:   Referring Physician: Dr. David Capps  Diagnosis:      Chronic bilateral low back pain with left-sided sciatica (M54.42,G89.29) Date of Service: 7/12/2021     PATIENT SUMMARY   Mary Beth Serrano is a 49year old male who presents to therapy t sciatica. Pt and PT discussed evaluation findings, pathology, POC and HEP. Pt voiced understanding and performs HEP correctly without reported pain.  Skilled Physical Therapy is medically necessary to address the above impairments and reach functional goal Complexity decision making . PLAN OF CARE:    Goals: (to be met in 8 visits)   1. Patient to report independence with HEP to facilitate self management and progress made int PT.    2. Patient to have negative SLR and SLUMP tests B to facilitate increased s

## 2021-07-13 ENCOUNTER — OFFICE VISIT (OUTPATIENT)
Dept: NEUROLOGY | Facility: CLINIC | Age: 56
End: 2021-07-13
Payer: COMMERCIAL

## 2021-07-13 ENCOUNTER — TELEPHONE (OUTPATIENT)
Dept: NEUROLOGY | Facility: CLINIC | Age: 56
End: 2021-07-13

## 2021-07-13 VITALS
HEART RATE: 94 BPM | DIASTOLIC BLOOD PRESSURE: 88 MMHG | WEIGHT: 203 LBS | OXYGEN SATURATION: 96 % | SYSTOLIC BLOOD PRESSURE: 142 MMHG | BODY MASS INDEX: 32.62 KG/M2 | HEIGHT: 66 IN

## 2021-07-13 DIAGNOSIS — M51.37 DDD (DEGENERATIVE DISC DISEASE), LUMBOSACRAL: ICD-10-CM

## 2021-07-13 DIAGNOSIS — M48.061 LUMBAR FORAMINAL STENOSIS: ICD-10-CM

## 2021-07-13 DIAGNOSIS — M99.9 BIOMECHANICAL LESION: ICD-10-CM

## 2021-07-13 DIAGNOSIS — M51.26 BULGE OF LUMBAR DISC WITHOUT MYELOPATHY: ICD-10-CM

## 2021-07-13 DIAGNOSIS — R29.898 RIGHT LEG WEAKNESS: Primary | ICD-10-CM

## 2021-07-13 DIAGNOSIS — I10 ESSENTIAL HYPERTENSION: ICD-10-CM

## 2021-07-13 DIAGNOSIS — M51.16 LUMBAR DISC HERNIATION WITH RADICULOPATHY: ICD-10-CM

## 2021-07-13 DIAGNOSIS — E78.5 HYPERLIPIDEMIA, UNSPECIFIED HYPERLIPIDEMIA TYPE: ICD-10-CM

## 2021-07-13 DIAGNOSIS — M54.59 MECHANICAL LOW BACK PAIN: ICD-10-CM

## 2021-07-13 DIAGNOSIS — R29.898 WEAKNESS OF RIGHT FOOT: ICD-10-CM

## 2021-07-13 DIAGNOSIS — M47.816 LUMBAR SPONDYLOSIS: ICD-10-CM

## 2021-07-13 PROCEDURE — 3079F DIAST BP 80-89 MM HG: CPT | Performed by: PHYSICAL MEDICINE & REHABILITATION

## 2021-07-13 PROCEDURE — 3008F BODY MASS INDEX DOCD: CPT | Performed by: PHYSICAL MEDICINE & REHABILITATION

## 2021-07-13 PROCEDURE — 99244 OFF/OP CNSLTJ NEW/EST MOD 40: CPT | Performed by: PHYSICAL MEDICINE & REHABILITATION

## 2021-07-13 PROCEDURE — 3077F SYST BP >= 140 MM HG: CPT | Performed by: PHYSICAL MEDICINE & REHABILITATION

## 2021-07-13 NOTE — H&P
[address] H&P    Requesting Physician: Gregory Mg MD    Chief Complaint (Reason for Visit):  Patient presents with:  Low Back Pain: New right handed male comes in for bilateral low back pain Problem Relation Age of Onset   • Cancer Father    • Diabetes Mother         Type 2   • Heart Disorder Mother    • Hypertension Brother    • Lipids Brother         Hyperlipidemia   • Arthritis Brother         Gout   • Hypertension Maternal Grandmother HPI  Endo/Heme/Allergies: Negative. Psychiatric/Behavioral: Negative. All other systems reviewed and are negative. Pertinent positives and negatives noted in the HPI.         PHYSICAL EXAM:   /88   Pulse 94   Ht 66\"   Wt 203 lb (92.1 kg)   Sp mmol/L Final   • Chloride 04/19/2021 101  98 - 112 mmol/L Final   • CO2 04/19/2021 27.0  21.0 - 32.0 mmol/L Final   • Anion Gap 04/19/2021 8  0 - 18 mmol/L Final   • BUN 04/19/2021 14  7 - 18 mg/dL Final   • Creatinine 04/19/2021 1.05  0.70 - 1.30 mg/dL Fi on 4/19/2021 at 12:35 PM       Finalized by (CST): Kanika Garg MD on 4/19/2021 at 12:38 PM              ASSESSMENT AND PLAN:  Andrea Thakkar is a pleasant 62-year-old male who presents with low back pain which radiates to the right lateral and anter

## 2021-07-13 NOTE — TELEPHONE ENCOUNTER
Completed [facility] Outpatient Prior Authorization Request Form to initiate authorization for L-Spine MRI CPT 02977 to be done at [address] [place] [address]. Clinicals and completed Outpatient PA Request Form faxed to [phone]  Status: pending

## 2021-07-14 ENCOUNTER — OFFICE VISIT (OUTPATIENT)
Dept: PHYSICAL THERAPY | Age: 56
End: 2021-07-14
Attending: NURSE PRACTITIONER
Payer: COMMERCIAL

## 2021-07-14 PROCEDURE — 97110 THERAPEUTIC EXERCISES: CPT | Performed by: PHYSICAL THERAPIST

## 2021-07-14 PROCEDURE — 97140 MANUAL THERAPY 1/> REGIONS: CPT | Performed by: PHYSICAL THERAPIST

## 2021-07-14 NOTE — PROGRESS NOTES
Dx: Chronic bilateral low back pain with left-sided sciatica (M54.42,G89.29)     Insurance (Authorized # of Visits):  45 visit limit        Authorizing Physician: Dr. Segundo Van  Next MD visit: none scheduled  Fall Risk: standard         Precautions: n/a 3 x 20 sec each       Manual:   STM/DTM R gluteals, L WNL so not completed  B gastroc stretch 4 x 15 sec each                        HEP:  Figure 4 hip ER stretch, across body piriformis stretch, s/l hip ER, Standing L/R hip flexor/gastroc stretch, add sta

## 2021-07-14 NOTE — TELEPHONE ENCOUNTER
L-Spine MRI CPT 27786 to be done at [address] [place] [address]. -APPROVED  Received [employer] authorization letter.    Authorconradaidane # [id number] valid 7/13/21 thru 10/11/21     Notified patient via [address]     Determination letter sent to scanning

## 2021-07-19 ENCOUNTER — OFFICE VISIT (OUTPATIENT)
Dept: PHYSICAL THERAPY | Age: 56
End: 2021-07-19
Attending: NURSE PRACTITIONER
Payer: COMMERCIAL

## 2021-07-19 PROCEDURE — 97140 MANUAL THERAPY 1/> REGIONS: CPT | Performed by: PHYSICAL THERAPIST

## 2021-07-19 PROCEDURE — 97110 THERAPEUTIC EXERCISES: CPT | Performed by: PHYSICAL THERAPIST

## 2021-07-19 NOTE — PROGRESS NOTES
Dx: Chronic bilateral low back pain with left-sided sciatica (M54.42,G89.29)     Insurance (Authorized # of Visits):  45 visit limit        Authorizing Physician: Dr. Gearldean Bumpers  Next MD visit: none scheduled  Fall Risk: standard         Precautions: n/a cow - next session  Standing B heel/toe raises x 20 reps  S/L hip circles x 10 reps each clockwise/counterclockwise  Standing B heel/toe raises x 20 reps  Standing B gastroc stretch on slant board 3 x 20 sec each There Ex:   HEP review/practice; see below.

## 2021-07-21 ENCOUNTER — OFFICE VISIT (OUTPATIENT)
Dept: PHYSICAL THERAPY | Age: 56
End: 2021-07-21
Attending: NURSE PRACTITIONER
Payer: COMMERCIAL

## 2021-07-21 PROCEDURE — 97140 MANUAL THERAPY 1/> REGIONS: CPT | Performed by: PHYSICAL THERAPIST

## 2021-07-21 PROCEDURE — 97110 THERAPEUTIC EXERCISES: CPT | Performed by: PHYSICAL THERAPIST

## 2021-07-21 NOTE — PROGRESS NOTES
Dx: Chronic bilateral low back pain with left-sided sciatica (M54.42,G89.29)     Insurance (Authorized # of Visits):  45 visit limit        Authorizing Physician: Dr. Kushal Ware  Next MD visit: none scheduled  Fall Risk: standard         Precautions: n/a for adductor isometric x 10 reps  Cat - cow - next session  Standing B heel/toe raises x 20 reps  S/L hip circles x 10 reps each clockwise/counterclockwise  Standing B heel/toe raises x 20 reps  Standing B gastroc stretch on slant board 3 x 20 sec each The flexor/gastroc stretch, standing B heel/toe raises with UE support ( seated if needed/better tolerated)    Charges: 1 manual, 2 TE    Total Timed Treatment: 45 min  Total Treatment Time: 45 min

## 2021-07-26 ENCOUNTER — OFFICE VISIT (OUTPATIENT)
Dept: PHYSICAL THERAPY | Age: 56
End: 2021-07-26
Attending: NURSE PRACTITIONER
Payer: COMMERCIAL

## 2021-07-26 PROCEDURE — 97110 THERAPEUTIC EXERCISES: CPT | Performed by: PHYSICAL THERAPIST

## 2021-07-26 PROCEDURE — 97140 MANUAL THERAPY 1/> REGIONS: CPT | Performed by: PHYSICAL THERAPIST

## 2021-07-26 NOTE — PROGRESS NOTES
Dx: Chronic bilateral low back pain with left-sided sciatica (M54.42,G89.29)     Insurance (Authorized # of Visits):  45 visit limit        Authorizing Physician: Dr. Wing Henson  Next MD visit: none scheduled  Fall Risk: standard         Precautions: n/a below.   Due to pt inquiry, completed supine L/R sciatic nerve glides 5 x 5 sec each  Bridge with ball for adductor isometric x 10 reps  Cat - cow - next session  Standing B heel/toe raises x 20 reps  S/L hip circles x 10 reps each clockwise/counterclockwi Seated L/R ankle sciatic nerve glides x 10 reps  each ( attempted standing, stopped due to R buttock c/o)  Rockerboard taps ant/post and med/lat at bar x 10 reps each, balance x 30 sec each  Standing B gastroc stretch on slant board 3 x 20 sec each    Ma

## 2021-07-27 RX ORDER — ATORVASTATIN CALCIUM 20 MG/1
TABLET, FILM COATED ORAL
Qty: 90 TABLET | Refills: 0 | Status: SHIPPED | OUTPATIENT
Start: 2021-07-27 | End: 2021-10-22

## 2021-07-28 ENCOUNTER — OFFICE VISIT (OUTPATIENT)
Dept: PHYSICAL THERAPY | Age: 56
End: 2021-07-28
Attending: NURSE PRACTITIONER
Payer: COMMERCIAL

## 2021-07-28 PROCEDURE — 97140 MANUAL THERAPY 1/> REGIONS: CPT | Performed by: PHYSICAL THERAPIST

## 2021-07-28 PROCEDURE — 97110 THERAPEUTIC EXERCISES: CPT | Performed by: PHYSICAL THERAPIST

## 2021-07-28 NOTE — PROGRESS NOTES
Dx: Chronic bilateral low back pain with left-sided sciatica (M54.42,G89.29)     Insurance (Authorized # of Visits):  45 visit limit        Authorizing Physician: Dr. Hortensia Mclain  Next MD visit: none scheduled  Fall Risk: standard         Precautions: n/a cow - next session  Standing B heel/toe raises x 20 reps  S/L hip circles x 10 reps each clockwise/counterclockwise  Standing B heel/toe raises x 20 reps  Standing B gastroc stretch on slant board 3 x 20 sec each There Ex:   HEP review/practice; see below. at bar x 10 reps each, balance x 30 sec each  Standing B gastroc stretch on slant board 3 x 20 sec each There Ex:   Bridge on ball x 30 reps - pt reports completing at home, discontinue going forward  R ankle DF using RTB x 30 reps   Supine across body pir

## 2021-07-29 ENCOUNTER — TELEPHONE (OUTPATIENT)
Dept: FAMILY MEDICINE CLINIC | Facility: CLINIC | Age: 56
End: 2021-07-29

## 2021-07-29 NOTE — TELEPHONE ENCOUNTER
[facility] requiring follow up appointment for Pap therapy. Patient currently has a follow up appointment on 08/09. Please fax OV notes after follow up visit to fax number : [phone].      OV notes must state that patient is compliant with the usag

## 2021-08-02 ENCOUNTER — OFFICE VISIT (OUTPATIENT)
Dept: PHYSICAL THERAPY | Age: 56
End: 2021-08-02
Attending: NURSE PRACTITIONER
Payer: COMMERCIAL

## 2021-08-02 PROCEDURE — 97110 THERAPEUTIC EXERCISES: CPT | Performed by: PHYSICAL THERAPIST

## 2021-08-02 PROCEDURE — 97140 MANUAL THERAPY 1/> REGIONS: CPT | Performed by: PHYSICAL THERAPIST

## 2021-08-02 NOTE — PROGRESS NOTES
Dx: Chronic bilateral low back pain with left-sided sciatica (M54.42,G89.29)     Insurance (Authorized # of Visits):  45 visit limit        Authorizing Physician: Dr. Ricky Morales  Next MD visit: none scheduled  Fall Risk: standard         Precautions: n/a proper bodymechanics and sitting posture in order to reduce risk for LBP/LE c/o.   4. Patient to have 30 deg active lumbar ext without c/o in order to complete overhead ADL.    5. Patient to report 50% improvement in LBP/LE c/o for improved walking, standin piriformis stretch x 20 sec each L/R  Supine L/R figure 4 stretch x 20 sec each L/R  Prone alternate hip ext x 15 reps- c/o  R glute tightening, STM/DTM following  Cat - cow quadruped x 5 reps   Rockerboard taps ant/post and med/lat at bar x 10 reps each, forward  Seated L/R ankle sciatic nerve glides x 10 reps  each   Lateral and monster walks ( ant/post diagonal steps) RTB 3 x ~ 16 feet  Rockerboard taps ant/post at bar x 10 reps each, balance x 45 sec each  HEP review; see below.         Manual:   STM/DTM

## 2021-08-04 ENCOUNTER — OFFICE VISIT (OUTPATIENT)
Dept: PHYSICAL THERAPY | Age: 56
End: 2021-08-04
Attending: NURSE PRACTITIONER
Payer: COMMERCIAL

## 2021-08-04 PROCEDURE — 97140 MANUAL THERAPY 1/> REGIONS: CPT | Performed by: PHYSICAL THERAPIST

## 2021-08-04 PROCEDURE — 97110 THERAPEUTIC EXERCISES: CPT | Performed by: PHYSICAL THERAPIST

## 2021-08-04 NOTE — PROGRESS NOTES
ProgressSummary  Pt has attended 8 visits in Physical Therapy.      Dx: Chronic bilateral low back pain with left-sided sciatica (M54.42,G89.29)     Insurance (Authorized # of Visits):  45 visit limit        Authorizing Physician: Dr. Devante Faust MD visit: axis due to hx of knee pain) if patient returns to PT in future and pending MRI results/physician f/u. Goals:    (to be met in 8 visits)   1. Patient to report independence with HEP to facilitate self management and progress made int PT. - met  2.  Rae not completed today  Supine across body piriformis stretch x 20 sec each L/R  Supine L/R figure 4 stretch x 20 sec each L/R  Prone alternate hip ext x 15 reps- c/o  R glute tightening, STM/DTM following  Cat - cow quadruped x 5 reps   Rockerboard taps ant/ completing at home, discontinue going forward  Seated L/R ankle sciatic nerve glides x 10 reps  each   Lateral and monster walks ( ant/post diagonal steps) RTB 3 x ~ 16 feet  Rockerboard taps ant/post at bar x 10 reps each, balance x 45 sec each  HEP revie manual, 2 TE    Total Timed Treatment: 45 min  Total Treatment Time: 45 min    FOTO: 69.07    Plan:  Patient to have lumbar MRI 8/5/2021 and physician f/u after.   Patient will benefit from having the option of continuation of PT 1-2 x weekly for 4-8 more v

## 2021-08-05 ENCOUNTER — TELEPHONE (OUTPATIENT)
Dept: FAMILY MEDICINE CLINIC | Facility: CLINIC | Age: 56
End: 2021-08-05

## 2021-08-05 ENCOUNTER — HOSPITAL ENCOUNTER (OUTPATIENT)
Dept: MRI IMAGING | Age: 56
Discharge: HOME | End: 2021-08-05
Attending: PHYSICAL MEDICINE & REHABILITATION
Payer: COMMERCIAL

## 2021-08-05 DIAGNOSIS — M47.816 LUMBAR SPONDYLOSIS: ICD-10-CM

## 2021-08-05 DIAGNOSIS — R29.898 WEAKNESS OF RIGHT FOOT: ICD-10-CM

## 2021-08-05 DIAGNOSIS — M51.37 DDD (DEGENERATIVE DISC DISEASE), LUMBOSACRAL: ICD-10-CM

## 2021-08-05 DIAGNOSIS — M51.16 LUMBAR DISC HERNIATION WITH RADICULOPATHY: ICD-10-CM

## 2021-08-05 DIAGNOSIS — M54.59 MECHANICAL LOW BACK PAIN: ICD-10-CM

## 2021-08-05 DIAGNOSIS — I10 ESSENTIAL HYPERTENSION: ICD-10-CM

## 2021-08-05 DIAGNOSIS — E78.5 HYPERLIPIDEMIA, UNSPECIFIED HYPERLIPIDEMIA TYPE: ICD-10-CM

## 2021-08-05 DIAGNOSIS — R29.898 RIGHT LEG WEAKNESS: ICD-10-CM

## 2021-08-05 DIAGNOSIS — M48.061 LUMBAR FORAMINAL STENOSIS: ICD-10-CM

## 2021-08-05 DIAGNOSIS — M51.26 BULGE OF LUMBAR DISC WITHOUT MYELOPATHY: ICD-10-CM

## 2021-08-05 DIAGNOSIS — M99.9 BIOMECHANICAL LESION: ICD-10-CM

## 2021-08-05 RX ORDER — DIAZEPAM 5 MG/1
TABLET ORAL
Qty: 2 TABLET | Refills: 0 | Status: SHIPPED | OUTPATIENT
Start: 2021-08-05

## 2021-08-05 NOTE — TELEPHONE ENCOUNTER
Will order valium. Please call the patient and let him know. Kyler Russo.  Jennifer Spine MD, [address] [facility]  Physical Medicine and Rehabilitation/Sports Medicine  MEDICAL [facility]

## 2021-08-05 NOTE — TELEPHONE ENCOUNTER
Pt states he attempted to do his MRI in [facility] but had to abort due to claustro. Pt is rescheduled at [address] [place] [address] 8/8 and requesting an oral sedative be called into his pharmacy on file. Pt also mentioned he does have AYDEE.  Pt advised he will need a  if taking a

## 2021-08-08 ENCOUNTER — HOSPITAL ENCOUNTER (OUTPATIENT)
Dept: MRI IMAGING | Facility: HOSPITAL | Age: 56
Discharge: HOME | End: 2021-08-08
Attending: PHYSICAL MEDICINE & REHABILITATION
Payer: COMMERCIAL

## 2021-08-08 PROCEDURE — 72148 MRI LUMBAR SPINE W/O DYE: CPT | Performed by: PHYSICAL MEDICINE & REHABILITATION

## 2021-08-09 ENCOUNTER — OFFICE VISIT (OUTPATIENT)
Dept: FAMILY MEDICINE CLINIC | Facility: CLINIC | Age: 56
End: 2021-08-09
Payer: COMMERCIAL

## 2021-08-09 VITALS
HEIGHT: 66 IN | SYSTOLIC BLOOD PRESSURE: 113 MMHG | DIASTOLIC BLOOD PRESSURE: 75 MMHG | BODY MASS INDEX: 32.56 KG/M2 | HEART RATE: 71 BPM | WEIGHT: 202.63 LBS

## 2021-08-09 DIAGNOSIS — G47.33 OBSTRUCTIVE SLEEP APNEA SYNDROME: Primary | ICD-10-CM

## 2021-08-09 PROCEDURE — 3008F BODY MASS INDEX DOCD: CPT | Performed by: FAMILY MEDICINE

## 2021-08-09 PROCEDURE — 3074F SYST BP LT 130 MM HG: CPT | Performed by: FAMILY MEDICINE

## 2021-08-09 PROCEDURE — 99213 OFFICE O/P EST LOW 20 MIN: CPT | Performed by: FAMILY MEDICINE

## 2021-08-09 PROCEDURE — 3078F DIAST BP <80 MM HG: CPT | Performed by: FAMILY MEDICINE

## 2021-08-09 NOTE — PROGRESS NOTES
8/9/2021  10:39 AM    Michael Rojas is a 49year old male. Chief complaint(s): Patient presents with:  Cpap Issue: follow up    HPI:     Michael Rojas primary complaint is regarding sleep apnea.      Patient is a 45-year-old male who presents w No      Alcohol/week: 0.0 standard drinks      Comment: Very infrequently    Drug use: No       Immunizations:     Immunization History  Administered            Date(s) Administered    Covid-19 Vaccine Pfizer 30 mcg/0.3 ml                          03/19/20 headaches. PHYSICAL EXAM:   VS: /75   Pulse 71   Ht 5' 6\" (1.676 m)   Wt 202 lb 9.6 oz (91.9 kg)   BMI 32.70 kg/m²     Physical Exam  Vitals reviewed. Constitutional:       Appearance: Normal appearance. He is well-developed.    HENT:      He

## 2021-08-11 ENCOUNTER — OFFICE VISIT (OUTPATIENT)
Dept: NEUROLOGY | Facility: CLINIC | Age: 56
End: 2021-08-11
Payer: COMMERCIAL

## 2021-08-11 ENCOUNTER — HOSPITAL ENCOUNTER (OUTPATIENT)
Dept: GENERAL RADIOLOGY | Facility: HOSPITAL | Age: 56
Discharge: HOME | End: 2021-08-11
Attending: PHYSICAL MEDICINE & REHABILITATION
Payer: COMMERCIAL

## 2021-08-11 VITALS
HEART RATE: 75 BPM | BODY MASS INDEX: 32.47 KG/M2 | DIASTOLIC BLOOD PRESSURE: 74 MMHG | WEIGHT: 202 LBS | SYSTOLIC BLOOD PRESSURE: 126 MMHG | OXYGEN SATURATION: 97 % | HEIGHT: 66 IN

## 2021-08-11 DIAGNOSIS — M48.061 LUMBAR FORAMINAL STENOSIS: ICD-10-CM

## 2021-08-11 DIAGNOSIS — M51.16 LUMBAR DISC HERNIATION WITH RADICULOPATHY: ICD-10-CM

## 2021-08-11 DIAGNOSIS — S99.911A INJURY OF RIGHT ANKLE, INITIAL ENCOUNTER: ICD-10-CM

## 2021-08-11 DIAGNOSIS — R29.898 WEAKNESS OF RIGHT FOOT: ICD-10-CM

## 2021-08-11 DIAGNOSIS — M99.9 BIOMECHANICAL LESION: ICD-10-CM

## 2021-08-11 DIAGNOSIS — M51.37 DDD (DEGENERATIVE DISC DISEASE), LUMBOSACRAL: ICD-10-CM

## 2021-08-11 DIAGNOSIS — M51.26 BULGE OF LUMBAR DISC WITHOUT MYELOPATHY: ICD-10-CM

## 2021-08-11 DIAGNOSIS — S93.491A SPRAIN OF ANTERIOR TALOFIBULAR LIGAMENT OF RIGHT ANKLE, INITIAL ENCOUNTER: ICD-10-CM

## 2021-08-11 DIAGNOSIS — E78.5 HYPERLIPIDEMIA, UNSPECIFIED HYPERLIPIDEMIA TYPE: ICD-10-CM

## 2021-08-11 DIAGNOSIS — I10 ESSENTIAL HYPERTENSION: ICD-10-CM

## 2021-08-11 DIAGNOSIS — M47.816 LUMBAR SPONDYLOSIS: ICD-10-CM

## 2021-08-11 DIAGNOSIS — R29.898 RIGHT LEG WEAKNESS: ICD-10-CM

## 2021-08-11 DIAGNOSIS — M54.59 MECHANICAL LOW BACK PAIN: ICD-10-CM

## 2021-08-11 DIAGNOSIS — R29.898 RIGHT LEG WEAKNESS: Primary | ICD-10-CM

## 2021-08-11 PROCEDURE — 73610 X-RAY EXAM OF ANKLE: CPT | Performed by: PHYSICAL MEDICINE & REHABILITATION

## 2021-08-11 PROCEDURE — 3008F BODY MASS INDEX DOCD: CPT | Performed by: PHYSICAL MEDICINE & REHABILITATION

## 2021-08-11 PROCEDURE — 99214 OFFICE O/P EST MOD 30 MIN: CPT | Performed by: PHYSICAL MEDICINE & REHABILITATION

## 2021-08-11 PROCEDURE — 3078F DIAST BP <80 MM HG: CPT | Performed by: PHYSICAL MEDICINE & REHABILITATION

## 2021-08-11 PROCEDURE — 3074F SYST BP LT 130 MM HG: CPT | Performed by: PHYSICAL MEDICINE & REHABILITATION

## 2021-08-11 NOTE — PROGRESS NOTES
[address]  Progress Note    CHIEF COMPLAINT:  Patient presents with:  Imaging: MRI lumbar Spine F/U. Left eden low back pain . Pain is a 5/10. Ankle Pain: Right ankle sprain. Pain kathryn  4/10.  Patient fell Lipids Brother         Hyperlipidemia   • Arthritis Brother         Gout   • Hypertension Maternal Grandmother        CURRENT MEDICATIONS:   Current Outpatient Medications   Medication Sig Dispense Refill   • ATORVASTATIN 20 MG Oral Tab TAKE ONE TABLET BY capillary refill. Lungs: Non-labored respirations  Abdomen: No abdominal guarding  Extremities: No lower extremity edema bilaterally   Skin: No lesions noted.    Cognition: alert & oriented x 3, attentive, able to follow 2 step commands, comprehention int Status   • SARS-CoV-2 ([place]) 03/27/2021 Not Detected  Not Detected Final   ]      Radiology Imaging:  I reviewed with the patient his MRI of the lumbar spine from 8/8/2021  MRI SPINE LUMBAR (CPT=72148)  Narrative: PROCEDURE: MRI SPINE LUMBAR (CPT=72148) narrowing the central canal and severe left greater than right neural foraminal narrowing. 2.  Annular tear within a disc bulge at L4-5. Mild central canal and mild bilateral neural foraminal narrowing at this level.      Dictated by (CST): JERRI Umana myelopathy  DDD (degenerative disc disease), lumbosacral  Lumbar spondylosis  Lumbar disc herniation with radiculopathy  Hyperlipidemia, unspecified hyperlipidemia type  Sprain of anterior talofibular ligament of right ankle, initial encounter  Injury of r

## 2021-08-11 NOTE — PATIENT INSTRUCTIONS
1) Get XR of the right ankle today on your way out  2) Use Ibuprofen 600-800 mg three times per day  3) Continue to use crutches for support  4) See physical therapy for the right ankle and the low back  5) If no improvement after another month of therapy

## 2021-08-16 ENCOUNTER — TELEPHONE (OUTPATIENT)
Dept: PHYSICAL THERAPY | Age: 56
End: 2021-08-16

## 2021-08-25 ENCOUNTER — TELEPHONE (OUTPATIENT)
Dept: PHYSICAL THERAPY | Age: 56
End: 2021-08-25

## 2021-08-26 ENCOUNTER — APPOINTMENT (OUTPATIENT)
Dept: PHYSICAL THERAPY | Age: 56
End: 2021-08-26
Attending: PHYSICAL MEDICINE & REHABILITATION
Payer: COMMERCIAL

## 2021-08-27 ENCOUNTER — TELEPHONE (OUTPATIENT)
Dept: PHYSICAL THERAPY | Age: 56
End: 2021-08-27

## 2021-08-30 ENCOUNTER — TELEPHONE (OUTPATIENT)
Dept: PHYSICAL THERAPY | Age: 56
End: 2021-08-30

## 2021-08-30 NOTE — TELEPHONE ENCOUNTER
Left patient voicemail re: PT openings today at 10:15am and 3pm. Stated that appointments cannot be held for him, but if interested, to call  to see if appointments still available and to schedule.

## 2021-09-12 ENCOUNTER — PATIENT MESSAGE (OUTPATIENT)
Dept: FAMILY MEDICINE CLINIC | Facility: CLINIC | Age: 56
End: 2021-09-12

## 2021-09-13 ENCOUNTER — OFFICE VISIT (OUTPATIENT)
Dept: PHYSICAL THERAPY | Age: 56
End: 2021-09-13
Attending: PHYSICAL MEDICINE & REHABILITATION
Payer: COMMERCIAL

## 2021-09-13 DIAGNOSIS — M99.9 BIOMECHANICAL LESION: ICD-10-CM

## 2021-09-13 DIAGNOSIS — I10 ESSENTIAL HYPERTENSION: ICD-10-CM

## 2021-09-13 DIAGNOSIS — S99.911A INJURY OF RIGHT ANKLE, INITIAL ENCOUNTER: ICD-10-CM

## 2021-09-13 DIAGNOSIS — M47.816 LUMBAR SPONDYLOSIS: ICD-10-CM

## 2021-09-13 DIAGNOSIS — M51.26 BULGE OF LUMBAR DISC WITHOUT MYELOPATHY: ICD-10-CM

## 2021-09-13 DIAGNOSIS — R29.898 WEAKNESS OF RIGHT FOOT: ICD-10-CM

## 2021-09-13 DIAGNOSIS — S93.491A SPRAIN OF ANTERIOR TALOFIBULAR LIGAMENT OF RIGHT ANKLE, INITIAL ENCOUNTER: ICD-10-CM

## 2021-09-13 DIAGNOSIS — M54.59 MECHANICAL LOW BACK PAIN: ICD-10-CM

## 2021-09-13 DIAGNOSIS — R29.898 RIGHT LEG WEAKNESS: ICD-10-CM

## 2021-09-13 DIAGNOSIS — M51.16 LUMBAR DISC HERNIATION WITH RADICULOPATHY: ICD-10-CM

## 2021-09-13 DIAGNOSIS — M51.37 DDD (DEGENERATIVE DISC DISEASE), LUMBOSACRAL: ICD-10-CM

## 2021-09-13 DIAGNOSIS — E78.5 HYPERLIPIDEMIA, UNSPECIFIED HYPERLIPIDEMIA TYPE: ICD-10-CM

## 2021-09-13 DIAGNOSIS — M48.061 LUMBAR FORAMINAL STENOSIS: ICD-10-CM

## 2021-09-13 PROCEDURE — 97530 THERAPEUTIC ACTIVITIES: CPT | Performed by: PHYSICAL THERAPIST

## 2021-09-13 PROCEDURE — 97140 MANUAL THERAPY 1/> REGIONS: CPT | Performed by: PHYSICAL THERAPIST

## 2021-09-13 PROCEDURE — 97110 THERAPEUTIC EXERCISES: CPT | Performed by: PHYSICAL THERAPIST

## 2021-09-13 RX ORDER — LISINOPRIL AND HYDROCHLOROTHIAZIDE 20; 12.5 MG/1; MG/1
1 TABLET ORAL DAILY
Qty: 90 TABLET | Refills: 2 | Status: SHIPPED | OUTPATIENT
Start: 2021-09-13

## 2021-09-13 RX ORDER — COLCHICINE 0.6 MG/1
0.6 TABLET ORAL DAILY
Qty: 30 TABLET | Refills: 0 | Status: SHIPPED | OUTPATIENT
Start: 2021-09-13

## 2021-09-13 NOTE — PROGRESS NOTES
Dx: Chronic bilateral low back pain with left-sided sciatica (M54.42,G89.29)     Insurance (Authorized # of Visits):  45 visit limit        Authorizing Physician: Dr. Eduardo Billings  Next MD visit: none scheduled  Fall Risk: standard         Precautions: n/a LBP/LE c/o. - met  4. Patient to have 30 deg active lumbar ext without c/o in order to complete overhead ADL. - not met  5. Patient to report 50% improvement in LBP/LE c/o for improved walking, standing, and sitting tolerance.  - progress    Plan: Continue traction by letting LE hand/be unsuppoorted using machine at gym; avoid pain provocation  Bridge on ball x 30 reps - pt reports completing at home, discontinue going forward  Seated L/R ankle sciatic nerve glides x 10 reps  each   Lateral and monster walks toe clearance in order to reduce fall risk     There Activities:   Pt education re: caution with return to jogging/high impact activities/plyometrics for both lumbar spine and ankle; gradually wean from R ankle elastic band as well tolerated/safe

## 2021-09-13 NOTE — TELEPHONE ENCOUNTER
Patient Review of Clinical Information    Problems   The patient or proxy has not reviewed this information.      Medications   The patient or proxy has not reviewed this information, and there are updates pending:   Requested Medication Additions Sta

## 2021-09-15 ENCOUNTER — OFFICE VISIT (OUTPATIENT)
Dept: PHYSICAL THERAPY | Age: 56
End: 2021-09-15
Attending: PHYSICAL MEDICINE & REHABILITATION
Payer: COMMERCIAL

## 2021-09-15 PROCEDURE — 97140 MANUAL THERAPY 1/> REGIONS: CPT | Performed by: PHYSICAL THERAPIST

## 2021-09-15 PROCEDURE — 97110 THERAPEUTIC EXERCISES: CPT | Performed by: PHYSICAL THERAPIST

## 2021-09-15 NOTE — PROGRESS NOTES
Dx: Chronic bilateral low back pain with left-sided sciatica (M54.42,G89.29)     Insurance (Authorized # of Visits):  45 visit limit        Authorizing Physician: Dr. Kevon Tucker  Next MD visit: none scheduled  Fall Risk: standard         Precautions: n/a Date: 9/15/2021  Tx #: 10/16 - Pt arrived ~ 15 min late.    There Ex:   Bridge exercise progression as alternative to standing hip ext/hamstrings, if better tolerated: bridge x 5 reps, with alternate knee ext x 5 reps, with SLR x 5 reps each   S/L hip circl reps  S/L L/R hip circles x 20 reps each clockwise/counterclockwise  Long sitting L/R piriformis stretch  X 20  Figure 4 stretch x 20 sec each L/R  Quadruped contralateral hip ext/shoulder flexion x 10 reps each  Supine TA lumbar stabilization with hips/kn training: heel <-> toe, be cognizant of toe clearance in order to reduce fall risk NM Re-ed:   Gait training: heel <-> toe, be cognizant of toe clearance in order to reduce fall risk     There Activities:   Pt education re: caution with return to jogging/h

## 2021-09-20 ENCOUNTER — APPOINTMENT (OUTPATIENT)
Dept: PHYSICAL THERAPY | Age: 56
End: 2021-09-20
Attending: PHYSICAL MEDICINE & REHABILITATION
Payer: COMMERCIAL

## 2021-09-23 ENCOUNTER — APPOINTMENT (OUTPATIENT)
Dept: PHYSICAL THERAPY | Age: 56
End: 2021-09-23
Attending: PHYSICAL MEDICINE & REHABILITATION
Payer: COMMERCIAL

## 2021-09-27 ENCOUNTER — OFFICE VISIT (OUTPATIENT)
Dept: PHYSICAL THERAPY | Age: 56
End: 2021-09-27
Attending: PHYSICAL MEDICINE & REHABILITATION
Payer: COMMERCIAL

## 2021-09-27 PROCEDURE — 97110 THERAPEUTIC EXERCISES: CPT | Performed by: PHYSICAL THERAPIST

## 2021-09-27 NOTE — PROGRESS NOTES
Dx: Chronic bilateral low back pain with left-sided sciatica (M54.42,G89.29)     Insurance (Authorized # of Visits):  45 visit limit        Authorizing Physician: Dr. Bladimir Donnelly  Next MD visit: none scheduled  Fall Risk: standard         Precautions: n/a visits)   1. Patient to report independence with HEP to facilitate self management and progress made int PT. - met  2. Patient to have negative SLR and SLUMP tests B to facilitate increased stride length during gait and walking tolerance. - not met  3.  Carly Valerio ~ 16 feet  Rockerboard taps ant/post at bar x 10 reps each, balance x 45 sec each  HEP review; see below.       There Ex:   R ankle DF using RTB x 30 reps  S/L L/R hip circles x 20 reps each clockwise/counterclockwise  Long sitting L/R piriformis stretch  X Manual:   STM/DTM L/R gluteals  R gastroc/DF stretch 4 x 15 sec each  STM R ITB using foam roller, pt denied c/o L  - not completed L Manual:   STM/DTM L/R gluteals  R gastroc/DF stretch 4 x 15 sec each  STM R ITB using foam roller, pt denied c/o L  - not

## 2021-09-28 ENCOUNTER — OFFICE VISIT (OUTPATIENT)
Dept: PHYSICAL MEDICINE AND REHAB | Facility: CLINIC | Age: 56
End: 2021-09-28
Payer: COMMERCIAL

## 2021-09-28 VITALS
HEIGHT: 67 IN | HEART RATE: 70 BPM | DIASTOLIC BLOOD PRESSURE: 72 MMHG | BODY MASS INDEX: 29.82 KG/M2 | OXYGEN SATURATION: 98 % | SYSTOLIC BLOOD PRESSURE: 142 MMHG | WEIGHT: 190 LBS

## 2021-09-28 DIAGNOSIS — S99.911D INJURY OF RIGHT ANKLE, SUBSEQUENT ENCOUNTER: Primary | ICD-10-CM

## 2021-09-28 DIAGNOSIS — M51.26 BULGE OF LUMBAR DISC WITHOUT MYELOPATHY: ICD-10-CM

## 2021-09-28 DIAGNOSIS — S93.491D SPRAIN OF ANTERIOR TALOFIBULAR LIGAMENT OF RIGHT ANKLE, SUBSEQUENT ENCOUNTER: ICD-10-CM

## 2021-09-28 DIAGNOSIS — M54.59 MECHANICAL LOW BACK PAIN: ICD-10-CM

## 2021-09-28 DIAGNOSIS — M51.37 DDD (DEGENERATIVE DISC DISEASE), LUMBOSACRAL: ICD-10-CM

## 2021-09-28 DIAGNOSIS — M51.16 LUMBAR DISC HERNIATION WITH RADICULOPATHY: ICD-10-CM

## 2021-09-28 DIAGNOSIS — M48.061 LUMBAR FORAMINAL STENOSIS: ICD-10-CM

## 2021-09-28 DIAGNOSIS — M47.816 LUMBAR SPONDYLOSIS: ICD-10-CM

## 2021-09-28 PROCEDURE — 99213 OFFICE O/P EST LOW 20 MIN: CPT | Performed by: PHYSICAL MEDICINE & REHABILITATION

## 2021-09-28 PROCEDURE — 3078F DIAST BP <80 MM HG: CPT | Performed by: PHYSICAL MEDICINE & REHABILITATION

## 2021-09-28 PROCEDURE — 3077F SYST BP >= 140 MM HG: CPT | Performed by: PHYSICAL MEDICINE & REHABILITATION

## 2021-09-28 PROCEDURE — 3008F BODY MASS INDEX DOCD: CPT | Performed by: PHYSICAL MEDICINE & REHABILITATION

## 2021-09-28 NOTE — PATIENT INSTRUCTIONS
1) Continue with your home exercise program as it pertains to the ankle and low back  2) Use Naprosyn as needed for pain  3) Follow up with me as needed going forward

## 2021-09-28 NOTE — PROGRESS NOTES
[address]  Progress Note    CHIEF COMPLAINT:  Patient presents with:  Low Back Pain: LOV: 8/11/21 Patient localized L LBP, denies N/T. Currently in PT. Denies rx. Rates pain 0/10.   Ankle Pain: Localize R Diabetes Mother         Type 2   • Heart Disorder Mother    • Hypertension Brother    • Lipids Brother         Hyperlipidemia   • Arthritis Brother         Gout   • Hypertension Maternal Grandmother        CURRENT MEDICATIONS:   Current Outpatient [facility] movements intact. Ears: No auricular hematoma or deformities  Mouth: No lesions or ulcerations  Heart: peripheral pulses intact. Normal capillary refill.    Lungs: Non-labored respirations  Abdomen: No abdominal guarding  Extremities: No lower extremity e 10.0 - 20.0 Final   • Calcium, Total 04/19/2021 10.4* 8.5 - 10.1 mg/dL Final   • Calculated Osmolality 04/19/2021 282  275 - 295 mOsm/kg Final   • GFR, Non- 04/19/2021 80  >=60 Final   • GFR, -American 04/19/2021 93  >=60 Final   • A dislocation. 3. Mild tibiotalar joint osteoarthritis. 4. Mild Achilles enthesopathy.            Dictated by (CST): Celine Gresham MD on 8/11/2021 at 1:10 PM       Finalized by (CST): Celine Gresham MD on 8/11/2021 at 1:12 PM            PROCEDURE: exiting S1 and L5 nerve roots.  There is also moderate narrowing the central canal and severe left greater than right neural foraminal narrowing.    2.  Annular tear within a disc bulge at L4-5.  Mild central canal and mild bilateral neural foraminal narrow

## 2021-09-29 ENCOUNTER — OFFICE VISIT (OUTPATIENT)
Dept: PHYSICAL THERAPY | Age: 56
End: 2021-09-29
Attending: PHYSICAL MEDICINE & REHABILITATION
Payer: COMMERCIAL

## 2021-09-29 PROCEDURE — 97110 THERAPEUTIC EXERCISES: CPT | Performed by: PHYSICAL THERAPIST

## 2021-09-29 NOTE — PROGRESS NOTES
Aubrey  Pt has attended 12 visits in Physical Therapy.     Dx: Chronic bilateral low back pain with left-sided sciatica (M54.42,G89.29)     Insurance (Authorized # of Visits):  45 visit limit        Authorizing Physician: Dr. John Faust MD vi high impact exercise due to history of lumbar spine pathology, self report of knee OA, and history of R ankle sprain.  Due to progress, goals overall met, patient preference, recommend discharge from PT to HEP as appropriate/well tolerated and f/u with phys L/R  Quadruped contralateral hip ext/shoulder flexion x 10 reps each  Supine TA lumbar stabilization with hips/knees 90/90 with toe taps x 5 reps, with alternate knee ext x 5 reps  HEP review; see below.     There Ex:   L/R SLS with: 1. arm sweeps L <->R  x Marching 5 x 5 sec each, 2. Anterior and lateral steps x 5 reps each, 3.  SLS with contralateral hip flex/abd/ext 2 x 5 reps each    Manual:   STM/DTM L/R gluteals  R gastroc/DF stretch 4 x 15 sec each  STM R ITB using foam roller, pt denied c/o L  - not co f/u with physician for any unresolved c/o. Patient/Family/Caregiver was advised of these findings, precautions, and treatment options and has agreed to actively participate in planning and for this course of care.     Thank you for your referral. If you

## 2021-10-22 RX ORDER — ATORVASTATIN CALCIUM 20 MG/1
TABLET, FILM COATED ORAL
Qty: 90 TABLET | Refills: 0 | Status: SHIPPED | OUTPATIENT
Start: 2021-10-22 | End: 2021-12-11

## 2021-10-22 NOTE — TELEPHONE ENCOUNTER
Protocol failed or has No Protocol, please review  Requested Prescriptions   Pending Prescriptions Disp Refills    ATORVASTATIN 20 MG Oral Tab [Pharmacy Med Name: Atorvastatin Calcium 20 Mg Tab Nort] 90 tablet 0     Sig: TAKE ONE TABLET BY MOUTH ONE TIME D

## 2021-12-13 RX ORDER — ALLOPURINOL 300 MG/1
300 TABLET ORAL DAILY
Qty: 90 TABLET | Refills: 0 | Status: SHIPPED | OUTPATIENT
Start: 2021-12-13

## 2021-12-13 RX ORDER — ATORVASTATIN CALCIUM 20 MG/1
20 TABLET, FILM COATED ORAL EVERY EVENING
Qty: 90 TABLET | Refills: 0 | Status: SHIPPED | OUTPATIENT
Start: 2021-12-13

## 2021-12-13 RX ORDER — ATORVASTATIN CALCIUM 20 MG/1
TABLET, FILM COATED ORAL
Qty: 90 TABLET | Refills: 0 | Status: SHIPPED | OUTPATIENT
Start: 2021-12-13

## 2022-06-20 RX ORDER — COLCHICINE 0.6 MG/1
0.6 TABLET ORAL DAILY
Qty: 30 TABLET | Refills: 0 | Status: SHIPPED | OUTPATIENT
Start: 2022-06-20

## 2022-08-13 RX ORDER — COLCHICINE 0.6 MG/1
0.6 TABLET ORAL DAILY
Qty: 30 TABLET | Refills: 0 | OUTPATIENT
Start: 2022-08-13

## 2022-08-16 RX ORDER — COLCHICINE 0.6 MG/1
0.6 TABLET ORAL DAILY
Qty: 30 TABLET | Refills: 0 | OUTPATIENT
Start: 2022-08-16

## 2022-08-16 RX ORDER — COLCHICINE 0.6 MG/1
0.6 TABLET ORAL DAILY
Qty: 30 TABLET | Refills: 0 | Status: SHIPPED | OUTPATIENT
Start: 2022-08-16 | End: 2022-09-01

## 2022-08-16 NOTE — TELEPHONE ENCOUNTER
Patient scheduled 9-1-22 with Dr Stephanie Edwards. He is asking for a 30-supply while waiting for his appt.

## 2022-08-16 NOTE — TELEPHONE ENCOUNTER
Medication pended.       Future Appointments   Date Time Provider Jacqueline Sindy   9/1/2022 10:00 AM Brit Sheffield MD [facility] ADO

## 2022-09-01 ENCOUNTER — LAB ENCOUNTER (OUTPATIENT)
Dept: LAB | Age: 57
End: 2022-09-01
Attending: FAMILY MEDICINE
Payer: COMMERCIAL

## 2022-09-01 ENCOUNTER — OFFICE VISIT (OUTPATIENT)
Dept: FAMILY MEDICINE CLINIC | Facility: CLINIC | Age: 57
End: 2022-09-01
Payer: COMMERCIAL

## 2022-09-01 VITALS
HEART RATE: 73 BPM | DIASTOLIC BLOOD PRESSURE: 70 MMHG | SYSTOLIC BLOOD PRESSURE: 124 MMHG | HEIGHT: 67 IN | WEIGHT: 204 LBS | BODY MASS INDEX: 32.02 KG/M2

## 2022-09-01 DIAGNOSIS — Z00.00 PHYSICAL EXAM: ICD-10-CM

## 2022-09-01 DIAGNOSIS — Z00.00 PHYSICAL EXAM: Primary | ICD-10-CM

## 2022-09-01 LAB
ALBUMIN SERPL-MCNC: 4.3 G/DL (ref 3.4–5)
ALBUMIN/GLOB SERPL: 1.1 {RATIO} (ref 1–2)
ALP LIVER SERPL-CCNC: 62 U/L
ALT SERPL-CCNC: 51 U/L
ANION GAP SERPL CALC-SCNC: 7 MMOL/L (ref 0–18)
AST SERPL-CCNC: 22 U/L (ref 15–37)
BASOPHILS # BLD AUTO: 0.07 X10(3) UL (ref 0–0.2)
BASOPHILS NFR BLD AUTO: 0.8 %
BILIRUB SERPL-MCNC: 0.5 MG/DL (ref 0.1–2)
BILIRUB UR QL: NEGATIVE
BUN BLD-MCNC: 19 MG/DL (ref 7–18)
BUN/CREAT SERPL: 14.5 (ref 10–20)
CALCIUM BLD-MCNC: 9.7 MG/DL (ref 8.5–10.1)
CHLORIDE SERPL-SCNC: 98 MMOL/L (ref 98–112)
CHOLEST SERPL-MCNC: 137 MG/DL (ref ?–200)
CLARITY UR: CLEAR
CO2 SERPL-SCNC: 30 MMOL/L (ref 21–32)
COLOR UR: YELLOW
CREAT BLD-MCNC: 1.31 MG/DL
DEPRECATED RDW RBC AUTO: 40.1 FL (ref 35.1–46.3)
EOSINOPHIL # BLD AUTO: 0.25 X10(3) UL (ref 0–0.7)
EOSINOPHIL NFR BLD AUTO: 2.7 %
ERYTHROCYTE [DISTWIDTH] IN BLOOD BY AUTOMATED COUNT: 12.6 % (ref 11–15)
EST. AVERAGE GLUCOSE BLD GHB EST-MCNC: 128 MG/DL (ref 68–126)
FASTING PATIENT LIPID ANSWER: YES
FASTING STATUS PATIENT QL REPORTED: YES
GFR SERPLBLD BASED ON 1.73 SQ M-ARVRAT: 64 ML/MIN/1.73M2 (ref 60–?)
GLOBULIN PLAS-MCNC: 3.9 G/DL (ref 2.8–4.4)
GLUCOSE BLD-MCNC: 105 MG/DL (ref 70–99)
GLUCOSE UR-MCNC: NEGATIVE MG/DL
HBA1C MFR BLD: 6.1 % (ref ?–5.7)
HCT VFR BLD AUTO: 47.3 %
HDLC SERPL-MCNC: 36 MG/DL (ref 40–59)
HGB BLD-MCNC: 15.7 G/DL
HGB UR QL STRIP.AUTO: NEGATIVE
IMM GRANULOCYTES # BLD AUTO: 0.06 X10(3) UL (ref 0–1)
IMM GRANULOCYTES NFR BLD: 0.7 %
KETONES UR-MCNC: NEGATIVE MG/DL
LDLC SERPL CALC-MCNC: 73 MG/DL (ref ?–100)
LEUKOCYTE ESTERASE UR QL STRIP.AUTO: NEGATIVE
LYMPHOCYTES # BLD AUTO: 3.05 X10(3) UL (ref 1–4)
LYMPHOCYTES NFR BLD AUTO: 33.3 %
MCH RBC QN AUTO: 28.9 PG (ref 26–34)
MCHC RBC AUTO-ENTMCNC: 33.2 G/DL (ref 31–37)
MCV RBC AUTO: 87.1 FL
MONOCYTES # BLD AUTO: 0.59 X10(3) UL (ref 0.1–1)
MONOCYTES NFR BLD AUTO: 6.4 %
NEUTROPHILS # BLD AUTO: 5.13 X10 (3) UL (ref 1.5–7.7)
NEUTROPHILS # BLD AUTO: 5.13 X10(3) UL (ref 1.5–7.7)
NEUTROPHILS NFR BLD AUTO: 56.1 %
NITRITE UR QL STRIP.AUTO: NEGATIVE
NONHDLC SERPL-MCNC: 101 MG/DL (ref ?–130)
OSMOLALITY SERPL CALC.SUM OF ELEC: 283 MOSM/KG (ref 275–295)
PH UR: 5.5 [PH] (ref 5–8)
PLATELET # BLD AUTO: 305 10(3)UL (ref 150–450)
POTASSIUM SERPL-SCNC: 4.1 MMOL/L (ref 3.5–5.1)
PROT SERPL-MCNC: 8.2 G/DL (ref 6.4–8.2)
PROT UR-MCNC: NEGATIVE MG/DL
PSA SERPL-MCNC: 0.67 NG/ML (ref ?–4)
RBC # BLD AUTO: 5.43 X10(6)UL
SODIUM SERPL-SCNC: 135 MMOL/L (ref 136–145)
SP GR UR STRIP: 1.01 (ref 1–1.03)
TRIGL SERPL-MCNC: 161 MG/DL (ref 30–149)
TSI SER-ACNC: 1.64 MIU/ML (ref 0.36–3.74)
UROBILINOGEN UR STRIP-ACNC: 0.2
VIT D+METAB SERPL-MCNC: 41.6 NG/ML (ref 30–100)
VLDLC SERPL CALC-MCNC: 25 MG/DL (ref 0–30)
WBC # BLD AUTO: 9.2 X10(3) UL (ref 4–11)

## 2022-09-01 PROCEDURE — 82306 VITAMIN D 25 HYDROXY: CPT

## 2022-09-01 PROCEDURE — 99396 PREV VISIT EST AGE 40-64: CPT | Performed by: FAMILY MEDICINE

## 2022-09-01 PROCEDURE — 3008F BODY MASS INDEX DOCD: CPT | Performed by: FAMILY MEDICINE

## 2022-09-01 PROCEDURE — 3078F DIAST BP <80 MM HG: CPT | Performed by: FAMILY MEDICINE

## 2022-09-01 PROCEDURE — 83036 HEMOGLOBIN GLYCOSYLATED A1C: CPT

## 2022-09-01 PROCEDURE — 93005 ELECTROCARDIOGRAM TRACING: CPT

## 2022-09-01 PROCEDURE — 84153 ASSAY OF PSA TOTAL: CPT

## 2022-09-01 PROCEDURE — 81003 URINALYSIS AUTO W/O SCOPE: CPT

## 2022-09-01 PROCEDURE — 85025 COMPLETE CBC W/AUTO DIFF WBC: CPT

## 2022-09-01 PROCEDURE — 93010 ELECTROCARDIOGRAM REPORT: CPT | Performed by: FAMILY MEDICINE

## 2022-09-01 PROCEDURE — 80061 LIPID PANEL: CPT

## 2022-09-01 PROCEDURE — 80053 COMPREHEN METABOLIC PANEL: CPT

## 2022-09-01 PROCEDURE — 3074F SYST BP LT 130 MM HG: CPT | Performed by: FAMILY MEDICINE

## 2022-09-01 PROCEDURE — 36415 COLL VENOUS BLD VENIPUNCTURE: CPT

## 2022-09-01 PROCEDURE — 84443 ASSAY THYROID STIM HORMONE: CPT

## 2022-09-01 RX ORDER — ATORVASTATIN CALCIUM 20 MG/1
20 TABLET, FILM COATED ORAL EVERY EVENING
Qty: 90 TABLET | Refills: 0 | Status: SHIPPED | OUTPATIENT
Start: 2022-09-01

## 2022-09-01 RX ORDER — ALLOPURINOL 300 MG/1
300 TABLET ORAL DAILY
Qty: 90 TABLET | Refills: 0 | Status: SHIPPED | OUTPATIENT
Start: 2022-09-01

## 2022-09-01 RX ORDER — LISINOPRIL AND HYDROCHLOROTHIAZIDE 20; 12.5 MG/1; MG/1
1 TABLET ORAL DAILY
Qty: 90 TABLET | Refills: 3 | Status: SHIPPED | OUTPATIENT
Start: 2022-09-01

## 2022-09-01 RX ORDER — COLCHICINE 0.6 MG/1
0.6 TABLET ORAL DAILY
Qty: 30 TABLET | Refills: 0 | Status: SHIPPED | OUTPATIENT
Start: 2022-09-01 | End: 2022-09-02

## 2022-09-02 ENCOUNTER — TELEPHONE (OUTPATIENT)
Dept: FAMILY MEDICINE CLINIC | Facility: CLINIC | Age: 57
End: 2022-09-02

## 2022-09-02 NOTE — TELEPHONE ENCOUNTER
Interaction between atorvastatin and colchicine     SERIOUS  Potential for serious interaction; regular monitoring by your doctor required or alternate medication may be needed. Colchicine + Atorvastatin  Colchicine , Atorvastatin . Either increases toxicity of the other by added drug effects. Additional Information: Increased risk of muscle breakdown (rhabdomyolysis) including fatality. Routing to in office provider as well as Dr. Андрей Roth due to out of office status.

## 2022-09-02 NOTE — TELEPHONE ENCOUNTER
[facility] is calling to advise PCP on drug interaction with he following medications    richard and britney    Please advise.   Please call [phone]

## 2022-09-13 ENCOUNTER — MED REC SCAN ONLY (OUTPATIENT)
Dept: FAMILY MEDICINE CLINIC | Facility: CLINIC | Age: 57
End: 2022-09-13

## 2022-11-24 ENCOUNTER — PATIENT MESSAGE (OUTPATIENT)
Dept: FAMILY MEDICINE CLINIC | Facility: CLINIC | Age: 57
End: 2022-11-24

## 2022-11-24 ENCOUNTER — NURSE TRIAGE (OUTPATIENT)
Dept: FAMILY MEDICINE CLINIC | Facility: CLINIC | Age: 57
End: 2022-11-24

## 2022-11-24 NOTE — TELEPHONE ENCOUNTER
From: Belinda Abdi  To: Alexandra Varela MD  Sent: 11/24/2022 11:13 AM CST  Subject: Depression dr    How do I schedule appointment for depression?

## 2022-11-24 NOTE — TELEPHONE ENCOUNTER
----- Message from Alma Ewing sent at 11/24/2022 11:13 AM CST -----  Regarding: Depression dr  How do I schedule appointment for depression?

## 2022-11-25 NOTE — TELEPHONE ENCOUNTER
Action Requested: Summary for Provider     []  Critical Lab, Recommendations Needed  [] Need Additional Advice  []   FYI    []   Need Orders  [] Need Medications Sent to Pharmacy  []  Other     SUMMARY:   Spoke with pt,  verified, pt stated he was returning our call. Pt stated he was  trying to set up an appointment with Dr. Dino Magana on line but it didn't give him an option for depression. Pt stated he just want to talk to PCP, pt was asked if he has any sx or any stress at work, home, if he is unable to sleep or if he is thinking too much about something? Pt is not sure about his sx. He just want to talk to PCP, pt asked if he is on any anti depression meds, he stated no. Pt was offered to set up an appt with PCP or if he needs a therapist, [facility], or councelor, he declined and he is not sure. Pt was advised if he needs to speak to psyche or therapist he can call the tel # on the back of his insurance card or was offered to call [facility], he requested to send rhonda wilkerson tel # via [employer]. Pt stated he will just make an appt on line   RN was not able to Triage pt as he insist to do his appt on line and sounds uncooperative in answering question then he hang up. [employer] message with Marcelo Valles tel # sent to pt as requested.        NANCY

## 2022-11-28 NOTE — TELEPHONE ENCOUNTER
Patient reviewed information in alternative encounter with rhonda wilkerson contact information:    Last read by Belinda Abdi at 12:34 PM on 11/24/2022.

## 2023-03-06 NOTE — TELEPHONE ENCOUNTER
From: Sarah Treviño  To: DEEPAK Wang  Sent: 7/4/2021 12:53 PM CDT  Subject: Prescription Question    Can I have a refill on the Medrol? My left sciatica is much better, but unfortunately my right side is now suffering from Sciatica.  Thanks
02-Mar-2023

## 2023-04-17 ENCOUNTER — PATIENT MESSAGE (OUTPATIENT)
Dept: FAMILY MEDICINE CLINIC | Facility: CLINIC | Age: 58
End: 2023-04-17

## 2023-04-18 ENCOUNTER — PATIENT MESSAGE (OUTPATIENT)
Dept: FAMILY MEDICINE CLINIC | Facility: CLINIC | Age: 58
End: 2023-04-18

## 2023-04-18 NOTE — TELEPHONE ENCOUNTER
From: Camryn Yu  To: Charu Clayton MD  Sent: 4/17/2023 10:00 AM CDT  Subject: alex Reed note for unemployment office    Hel, I resigned from work last month due to various medical conditions. The unemployment office requested that I get a doctor's letter indicating that due to various medical condition, Adela Noble should work remotely from home. I was not able to go to work because of back pains, and medicine makes me sleepy.  Thank you

## 2023-04-18 NOTE — TELEPHONE ENCOUNTER
From alternative encounter:       Malou CHEW Em Triage Support (supporting Samantha Kramer MD) 39 minutes ago (8:30 AM)       Hello, will you be able to provide a Dr note for the [facility] office (the letter can be addressed to me), indicating that due to my various medical condition, it is best that I work remotely from home for few months.  Thanks      Last visit 9/2022

## 2023-04-18 NOTE — TELEPHONE ENCOUNTER
From: Karrie Best  To: Francis Schreiber MD  Sent: 4/18/2023 8:30 AM CDT  Subject: 's note    Hello, will you be able to provide a Dr note for the [employer] office (the letter can be addressed to me), indicating that due to my various medical condition, it is best that I work remotely from home for few months.  Thanks

## 2024-01-29 NOTE — TELEPHONE ENCOUNTER
[facility] questioning rx for ATORVASTATIN,  QTY 10      Said mail order requires 3mo supply. Questioning if rx correct or shoulg this  have been sent to local pharmacy?        REF# [id number] Xray Chest 1 View AP/PA.

## (undated) NOTE — LETTER
ENCOUNTER  Patient Class:   Admitting Provider: No admitting provider for patient encounter. Unit:     Hospital Service: No service for patient encounter. Attending Provider: No current attending provider for patient encounter.  Bed:     Visit Type:   Re Member ID    CANDY AHUMADA 1/14/1965 [id number]    Guarantor Name (ID) Guarantor Birth Date Guarantor Address Guarantor Type   CANDY AHUMADA ([id number]) 1/14/1965 [place] [id number]    CANDY AHUMADA ( Signed By: Soco Melton MD    Order Date: Apr 2, 2021 at 6:33 PM                       Sleep Study [id number] 11/13/2020  5:56 PM Negrito Peres MD   Signed by Negrito Peres MD on 11/16/20 at 0329 [facility] events per hour. The patient spent 23 minutes in the supine position, 163 minutes in the left-side position, and 33 minutes in the right-side position.   The respiratory event-related arousal index is 13.9 events per hour and the spontaneous arousal index (ICD-10 code G47.33) in a patient with a baseline combined apnea plus hypopnea index of 41.8 events per hour with clinical syndrome of body mass index 31, snoring, apneic events, prior diagnosis of AYDEE, daytime drowsiness, nocturnal awakenings, rare drowsy Interpretation: A favorable response was demonstrated to CPAP 11 CWP with significant limitation of respiratory events and optimization of comfort.     RECOMMENDATIONS:    1.        CPAP 11 CWP with an expiratory pressure release set to 3 CWP using a medium

## (undated) NOTE — LETTER
ENCOUNTER  Patient Class:   Admitting Provider: No admitting provider for patient encounter. Unit:     Hospital Service: No service for patient encounter. Attending Provider: No current attending provider for patient encounter.  Bed:     Visit Type:   Re Member ID    CANDY AHUMADA 1/14/1965 [id number]    Guarantor Name (ID) Guarantor Birth Date Guarantor Address Guarantor Type   CANDY AHUMADA ([id number]) 1/14/1965 [place] [id number]    CANYD AHUMADA ( Signed By: Seema Zhong MD    Order Date: Apr 2, 2021 at 6:33 PM                       Sleep Study [id number] 11/13/2020  5:56 PM Javad Finley MD   Signed by Javad Finley MD on 11/16/20 at 7874 [facility] events per hour. The patient spent 23 minutes in the supine position, 163 minutes in the left-side position, and 33 minutes in the right-side position.   The respiratory event-related arousal index is 13.9 events per hour and the spontaneous arousal index (ICD-10 code G47.33) in a patient with a baseline combined apnea plus hypopnea index of 41.8 events per hour with clinical syndrome of body mass index 31, snoring, apneic events, prior diagnosis of AYDEE, daytime drowsiness, nocturnal awakenings, rare drowsy Interpretation: A favorable response was demonstrated to CPAP 11 CWP with significant limitation of respiratory events and optimization of comfort.     RECOMMENDATIONS:    1.        CPAP 11 CWP with an expiratory pressure release set to 3 CWP using a medium

## (undated) NOTE — LETTER
AUTHORIZATION FOR SURGICAL OPERATION OR OTHER PROCEDURE    1.  I hereby authorize Dr. Renetta Rockwell, and [address] [place] [address] staff assigned to my case to perform the following operation and/or procedure at the [address] [place] [address]:    _______________________ Patient Name:  ______________________________________________________  (please print)      Patient signature:  ___________________________________________________             Relationship to Patient:           []  Parent    Responsible person

## (undated) NOTE — MR AVS SNAPSHOT
[address], [address]  [address] [facility]  [phone]               Thank you for choosing us for your health care visit with Tree Fry MD.  We are glad to serve you and happy to provide you with this summ * Lisinopril-Hydrochlorothiazide 20-12.5 MG Tabs   Take 1 tablet by mouth daily.  Take 1 tablet(s) by mouth daily           * Lisinopril-Hydrochlorothiazide 20-12.5 MG Tabs   Take 1 tablet by mouth  daily           ValACYclovir HCl 500 MG Tabs   Take one t Return in about 1 year (around 1/11/2018).          Referral Orders      Normal Orders This Visit    [facility] (INTERNAL) [[id number] CPT(R)]  Order #:  [id number]                  **REFERRAL REQUEST**    Your physician has referred you to a sp Avoid over sized portions. Don’t eat while when you’re bored.      EAT THESE FOODS MORE OFTEN: EAT THESE FOODS LESS OFTEN:   Make half your plate fruits and vegetables Highly refined, white starches including white bread, rice and pasta   Eat plenty of pr